# Patient Record
Sex: FEMALE | Race: WHITE | NOT HISPANIC OR LATINO | Employment: UNEMPLOYED | URBAN - NONMETROPOLITAN AREA
[De-identification: names, ages, dates, MRNs, and addresses within clinical notes are randomized per-mention and may not be internally consistent; named-entity substitution may affect disease eponyms.]

---

## 2023-02-08 ENCOUNTER — HOSPITAL ENCOUNTER (EMERGENCY)
Facility: HOSPITAL | Age: 23
Discharge: PSYCHIATRIC OR SUBSTANCE USE FACILITY | DRG: 885 | End: 2023-02-08
Attending: STUDENT IN AN ORGANIZED HEALTH CARE EDUCATION/TRAINING PROGRAM | Admitting: STUDENT IN AN ORGANIZED HEALTH CARE EDUCATION/TRAINING PROGRAM
Payer: COMMERCIAL

## 2023-02-08 ENCOUNTER — HOSPITAL ENCOUNTER (EMERGENCY)
Facility: HOSPITAL | Age: 23
Discharge: HOME | DRG: 885 | End: 2023-02-08
Attending: STUDENT IN AN ORGANIZED HEALTH CARE EDUCATION/TRAINING PROGRAM | Admitting: STUDENT IN AN ORGANIZED HEALTH CARE EDUCATION/TRAINING PROGRAM
Payer: COMMERCIAL

## 2023-02-08 ENCOUNTER — HOSPITAL ENCOUNTER (INPATIENT)
Facility: HOSPITAL | Age: 23
LOS: 7 days | Discharge: NURSING, REHAB OR LONG-TERM CARE | DRG: 885 | End: 2023-02-15
Attending: PSYCHIATRY & NEUROLOGY | Admitting: PSYCHIATRY & NEUROLOGY
Payer: COMMERCIAL

## 2023-02-08 VITALS
WEIGHT: 150 LBS | BODY MASS INDEX: 24.11 KG/M2 | TEMPERATURE: 97.3 F | HEIGHT: 66 IN | SYSTOLIC BLOOD PRESSURE: 117 MMHG | HEART RATE: 93 BPM | OXYGEN SATURATION: 97 % | RESPIRATION RATE: 18 BRPM | DIASTOLIC BLOOD PRESSURE: 74 MMHG

## 2023-02-08 VITALS
SYSTOLIC BLOOD PRESSURE: 108 MMHG | HEART RATE: 105 BPM | OXYGEN SATURATION: 98 % | BODY MASS INDEX: 24.11 KG/M2 | DIASTOLIC BLOOD PRESSURE: 63 MMHG | WEIGHT: 150 LBS | RESPIRATION RATE: 18 BRPM | TEMPERATURE: 97.3 F | HEIGHT: 66 IN

## 2023-02-08 DIAGNOSIS — R76.8 HEPATITIS C ANTIBODY TEST POSITIVE: Primary | ICD-10-CM

## 2023-02-08 DIAGNOSIS — F32.A DEPRESSION WITH SUICIDAL IDEATION: Primary | ICD-10-CM

## 2023-02-08 DIAGNOSIS — R45.851 DEPRESSION WITH SUICIDAL IDEATION: Primary | ICD-10-CM

## 2023-02-08 DIAGNOSIS — F32.A DEPRESSION, UNSPECIFIED DEPRESSION TYPE: Primary | ICD-10-CM

## 2023-02-08 LAB
ALBUMIN SERPL-MCNC: 3.5 G/DL (ref 3.5–5.2)
ALBUMIN/GLOB SERPL: 1.1 G/DL
ALP SERPL-CCNC: 113 U/L (ref 39–117)
ALT SERPL W P-5'-P-CCNC: 8 U/L (ref 1–33)
AMPHET+METHAMPHET UR QL: NEGATIVE
AMPHETAMINES UR QL: NEGATIVE
ANION GAP SERPL CALCULATED.3IONS-SCNC: 8.8 MMOL/L (ref 5–15)
AST SERPL-CCNC: 11 U/L (ref 1–32)
BACTERIA UR QL AUTO: ABNORMAL /HPF
BARBITURATES UR QL SCN: NEGATIVE
BASOPHILS # BLD AUTO: 0.04 10*3/MM3 (ref 0–0.2)
BASOPHILS NFR BLD AUTO: 0.5 % (ref 0–1.5)
BENZODIAZ UR QL SCN: NEGATIVE
BILIRUB SERPL-MCNC: <0.2 MG/DL (ref 0–1.2)
BILIRUB UR QL STRIP: NEGATIVE
BUN SERPL-MCNC: 10 MG/DL (ref 6–20)
BUN/CREAT SERPL: 15.9 (ref 7–25)
BUPRENORPHINE SERPL-MCNC: NEGATIVE NG/ML
CALCIUM SPEC-SCNC: 8.8 MG/DL (ref 8.6–10.5)
CANNABINOIDS SERPL QL: NEGATIVE
CHLORIDE SERPL-SCNC: 106 MMOL/L (ref 98–107)
CLARITY UR: ABNORMAL
CO2 SERPL-SCNC: 24.2 MMOL/L (ref 22–29)
COCAINE UR QL: NEGATIVE
COLOR UR: YELLOW
CREAT SERPL-MCNC: 0.63 MG/DL (ref 0.57–1)
DEPRECATED RDW RBC AUTO: 42.9 FL (ref 37–54)
EGFRCR SERPLBLD CKD-EPI 2021: 128 ML/MIN/1.73
EOSINOPHIL # BLD AUTO: 0.11 10*3/MM3 (ref 0–0.4)
EOSINOPHIL NFR BLD AUTO: 1.4 % (ref 0.3–6.2)
ERYTHROCYTE [DISTWIDTH] IN BLOOD BY AUTOMATED COUNT: 13 % (ref 12.3–15.4)
ETHANOL BLD-MCNC: <10 MG/DL (ref 0–10)
ETHANOL UR QL: <0.01 %
FLUAV RNA RESP QL NAA+PROBE: NOT DETECTED
FLUBV RNA RESP QL NAA+PROBE: NOT DETECTED
GLOBULIN UR ELPH-MCNC: 3.3 GM/DL
GLUCOSE SERPL-MCNC: 91 MG/DL (ref 65–99)
GLUCOSE UR STRIP-MCNC: NEGATIVE MG/DL
HCG SERPL QL: NEGATIVE
HCT VFR BLD AUTO: 40 % (ref 34–46.6)
HGB BLD-MCNC: 12.7 G/DL (ref 12–15.9)
HGB UR QL STRIP.AUTO: NEGATIVE
HYALINE CASTS UR QL AUTO: ABNORMAL /LPF
IMM GRANULOCYTES # BLD AUTO: 0.03 10*3/MM3 (ref 0–0.05)
IMM GRANULOCYTES NFR BLD AUTO: 0.4 % (ref 0–0.5)
KETONES UR QL STRIP: NEGATIVE
LEUKOCYTE ESTERASE UR QL STRIP.AUTO: ABNORMAL
LYMPHOCYTES # BLD AUTO: 2.47 10*3/MM3 (ref 0.7–3.1)
LYMPHOCYTES NFR BLD AUTO: 31.2 % (ref 19.6–45.3)
MAGNESIUM SERPL-MCNC: 1.7 MG/DL (ref 1.6–2.6)
MCH RBC QN AUTO: 28.6 PG (ref 26.6–33)
MCHC RBC AUTO-ENTMCNC: 31.8 G/DL (ref 31.5–35.7)
MCV RBC AUTO: 90.1 FL (ref 79–97)
METHADONE UR QL SCN: NEGATIVE
MONOCYTES # BLD AUTO: 0.58 10*3/MM3 (ref 0.1–0.9)
MONOCYTES NFR BLD AUTO: 7.3 % (ref 5–12)
NEUTROPHILS NFR BLD AUTO: 4.69 10*3/MM3 (ref 1.7–7)
NEUTROPHILS NFR BLD AUTO: 59.2 % (ref 42.7–76)
NITRITE UR QL STRIP: NEGATIVE
NRBC BLD AUTO-RTO: 0 /100 WBC (ref 0–0.2)
OPIATES UR QL: NEGATIVE
OXYCODONE UR QL SCN: NEGATIVE
PCP UR QL SCN: NEGATIVE
PH UR STRIP.AUTO: 6 [PH] (ref 5–8)
PLATELET # BLD AUTO: 256 10*3/MM3 (ref 140–450)
PMV BLD AUTO: 9.7 FL (ref 6–12)
POTASSIUM SERPL-SCNC: 4.1 MMOL/L (ref 3.5–5.2)
PROPOXYPH UR QL: NEGATIVE
PROT SERPL-MCNC: 6.8 G/DL (ref 6–8.5)
PROT UR QL STRIP: NEGATIVE
RBC # BLD AUTO: 4.44 10*6/MM3 (ref 3.77–5.28)
RBC # UR STRIP: ABNORMAL /HPF
REF LAB TEST METHOD: ABNORMAL
SARS-COV-2 RNA RESP QL NAA+PROBE: NOT DETECTED
SODIUM SERPL-SCNC: 139 MMOL/L (ref 136–145)
SP GR UR STRIP: <=1.005 (ref 1–1.03)
SQUAMOUS #/AREA URNS HPF: ABNORMAL /HPF
TRICYCLICS UR QL SCN: NEGATIVE
UROBILINOGEN UR QL STRIP: ABNORMAL
WBC # UR STRIP: ABNORMAL /HPF
WBC NRBC COR # BLD: 7.92 10*3/MM3 (ref 3.4–10.8)

## 2023-02-08 PROCEDURE — 80306 DRUG TEST PRSMV INSTRMNT: CPT | Performed by: STUDENT IN AN ORGANIZED HEALTH CARE EDUCATION/TRAINING PROGRAM

## 2023-02-08 PROCEDURE — 87636 SARSCOV2 & INF A&B AMP PRB: CPT | Performed by: STUDENT IN AN ORGANIZED HEALTH CARE EDUCATION/TRAINING PROGRAM

## 2023-02-08 PROCEDURE — 83735 ASSAY OF MAGNESIUM: CPT | Performed by: STUDENT IN AN ORGANIZED HEALTH CARE EDUCATION/TRAINING PROGRAM

## 2023-02-08 PROCEDURE — 84703 CHORIONIC GONADOTROPIN ASSAY: CPT | Performed by: STUDENT IN AN ORGANIZED HEALTH CARE EDUCATION/TRAINING PROGRAM

## 2023-02-08 PROCEDURE — 87591 N.GONORRHOEAE DNA AMP PROB: CPT | Performed by: PSYCHIATRY & NEUROLOGY

## 2023-02-08 PROCEDURE — 82077 ASSAY SPEC XCP UR&BREATH IA: CPT | Performed by: STUDENT IN AN ORGANIZED HEALTH CARE EDUCATION/TRAINING PROGRAM

## 2023-02-08 PROCEDURE — 36415 COLL VENOUS BLD VENIPUNCTURE: CPT

## 2023-02-08 PROCEDURE — 85025 COMPLETE CBC W/AUTO DIFF WBC: CPT | Performed by: STUDENT IN AN ORGANIZED HEALTH CARE EDUCATION/TRAINING PROGRAM

## 2023-02-08 PROCEDURE — 87661 TRICHOMONAS VAGINALIS AMPLIF: CPT | Performed by: PSYCHIATRY & NEUROLOGY

## 2023-02-08 PROCEDURE — 99285 EMERGENCY DEPT VISIT HI MDM: CPT

## 2023-02-08 PROCEDURE — 87491 CHLMYD TRACH DNA AMP PROBE: CPT | Performed by: PSYCHIATRY & NEUROLOGY

## 2023-02-08 PROCEDURE — 81001 URINALYSIS AUTO W/SCOPE: CPT | Performed by: STUDENT IN AN ORGANIZED HEALTH CARE EDUCATION/TRAINING PROGRAM

## 2023-02-08 PROCEDURE — C9803 HOPD COVID-19 SPEC COLLECT: HCPCS

## 2023-02-08 PROCEDURE — 93005 ELECTROCARDIOGRAM TRACING: CPT | Performed by: PSYCHIATRY & NEUROLOGY

## 2023-02-08 PROCEDURE — 99284 EMERGENCY DEPT VISIT MOD MDM: CPT

## 2023-02-08 PROCEDURE — 80053 COMPREHEN METABOLIC PANEL: CPT | Performed by: STUDENT IN AN ORGANIZED HEALTH CARE EDUCATION/TRAINING PROGRAM

## 2023-02-08 RX ORDER — TRAZODONE HYDROCHLORIDE 50 MG/1
100 TABLET ORAL NIGHTLY
Status: DISCONTINUED | OUTPATIENT
Start: 2023-02-08 | End: 2023-02-15 | Stop reason: HOSPADM

## 2023-02-08 RX ORDER — NITROFURANTOIN 25; 75 MG/1; MG/1
100 CAPSULE ORAL 2 TIMES DAILY
Status: CANCELLED | OUTPATIENT
Start: 2023-02-08 | End: 2023-02-12

## 2023-02-08 RX ORDER — RISPERIDONE 1 MG/1
2 TABLET ORAL 2 TIMES DAILY
Status: DISCONTINUED | OUTPATIENT
Start: 2023-02-08 | End: 2023-02-15 | Stop reason: HOSPADM

## 2023-02-08 RX ORDER — VENLAFAXINE HYDROCHLORIDE 150 MG/1
150 CAPSULE, EXTENDED RELEASE ORAL DAILY
COMMUNITY
End: 2023-02-15 | Stop reason: HOSPADM

## 2023-02-08 RX ORDER — BENZTROPINE MESYLATE 1 MG/1
1 TABLET ORAL 2 TIMES DAILY
Status: CANCELLED | OUTPATIENT
Start: 2023-02-08

## 2023-02-08 RX ORDER — VENLAFAXINE HYDROCHLORIDE 150 MG/1
150 CAPSULE, EXTENDED RELEASE ORAL DAILY
Status: CANCELLED | OUTPATIENT
Start: 2023-02-08

## 2023-02-08 RX ORDER — BENZTROPINE MESYLATE 1 MG/ML
1 INJECTION INTRAMUSCULAR; INTRAVENOUS ONCE AS NEEDED
Status: DISCONTINUED | OUTPATIENT
Start: 2023-02-08 | End: 2023-02-15 | Stop reason: HOSPADM

## 2023-02-08 RX ORDER — TRAZODONE HYDROCHLORIDE 50 MG/1
50 TABLET ORAL NIGHTLY PRN
Status: DISCONTINUED | OUTPATIENT
Start: 2023-02-08 | End: 2023-02-15 | Stop reason: HOSPADM

## 2023-02-08 RX ORDER — BENZTROPINE MESYLATE 1 MG/1
2 TABLET ORAL ONCE AS NEEDED
Status: DISCONTINUED | OUTPATIENT
Start: 2023-02-08 | End: 2023-02-15 | Stop reason: HOSPADM

## 2023-02-08 RX ORDER — TRAZODONE HYDROCHLORIDE 100 MG/1
100 TABLET ORAL NIGHTLY
Status: ON HOLD | COMMUNITY
End: 2023-02-15 | Stop reason: SDUPTHER

## 2023-02-08 RX ORDER — NITROFURANTOIN 25; 75 MG/1; MG/1
100 CAPSULE ORAL 2 TIMES DAILY
Status: DISCONTINUED | OUTPATIENT
Start: 2023-02-08 | End: 2023-02-10

## 2023-02-08 RX ORDER — ACETAMINOPHEN 325 MG/1
650 TABLET ORAL EVERY 6 HOURS PRN
Status: DISCONTINUED | OUTPATIENT
Start: 2023-02-08 | End: 2023-02-15 | Stop reason: HOSPADM

## 2023-02-08 RX ORDER — TRAZODONE HYDROCHLORIDE 50 MG/1
100 TABLET ORAL NIGHTLY
Status: CANCELLED | OUTPATIENT
Start: 2023-02-08

## 2023-02-08 RX ORDER — BENZTROPINE MESYLATE 1 MG/1
1 TABLET ORAL 2 TIMES DAILY
Status: ON HOLD | COMMUNITY
End: 2023-02-15 | Stop reason: SDUPTHER

## 2023-02-08 RX ORDER — ONDANSETRON 4 MG/1
4 TABLET, FILM COATED ORAL EVERY 6 HOURS PRN
Status: DISCONTINUED | OUTPATIENT
Start: 2023-02-08 | End: 2023-02-15 | Stop reason: HOSPADM

## 2023-02-08 RX ORDER — BENZTROPINE MESYLATE 1 MG/1
1 TABLET ORAL 2 TIMES DAILY
Status: DISCONTINUED | OUTPATIENT
Start: 2023-02-08 | End: 2023-02-15 | Stop reason: HOSPADM

## 2023-02-08 RX ORDER — FAMOTIDINE 20 MG/1
20 TABLET, FILM COATED ORAL 2 TIMES DAILY PRN
Status: DISCONTINUED | OUTPATIENT
Start: 2023-02-08 | End: 2023-02-15 | Stop reason: HOSPADM

## 2023-02-08 RX ORDER — LOPERAMIDE HYDROCHLORIDE 2 MG/1
2 CAPSULE ORAL
Status: DISCONTINUED | OUTPATIENT
Start: 2023-02-08 | End: 2023-02-15 | Stop reason: HOSPADM

## 2023-02-08 RX ORDER — VENLAFAXINE HYDROCHLORIDE 150 MG/1
150 CAPSULE, EXTENDED RELEASE ORAL DAILY
Status: DISCONTINUED | OUTPATIENT
Start: 2023-02-09 | End: 2023-02-11

## 2023-02-08 RX ORDER — NITROFURANTOIN 25; 75 MG/1; MG/1
100 CAPSULE ORAL 2 TIMES DAILY
COMMUNITY
End: 2023-02-15 | Stop reason: HOSPADM

## 2023-02-08 RX ORDER — NICOTINE 21 MG/24HR
1 PATCH, TRANSDERMAL 24 HOURS TRANSDERMAL
Status: DISCONTINUED | OUTPATIENT
Start: 2023-02-08 | End: 2023-02-11

## 2023-02-08 RX ORDER — RISPERIDONE 2 MG/1
2 TABLET ORAL 2 TIMES DAILY
COMMUNITY
End: 2023-02-15 | Stop reason: HOSPADM

## 2023-02-08 RX ORDER — ALUMINA, MAGNESIA, AND SIMETHICONE 2400; 2400; 240 MG/30ML; MG/30ML; MG/30ML
15 SUSPENSION ORAL EVERY 6 HOURS PRN
Status: DISCONTINUED | OUTPATIENT
Start: 2023-02-08 | End: 2023-02-15 | Stop reason: HOSPADM

## 2023-02-08 RX ORDER — IBUPROFEN 400 MG/1
400 TABLET ORAL EVERY 6 HOURS PRN
Status: DISCONTINUED | OUTPATIENT
Start: 2023-02-08 | End: 2023-02-15 | Stop reason: HOSPADM

## 2023-02-08 RX ORDER — HYDROXYZINE HYDROCHLORIDE 25 MG/1
50 TABLET, FILM COATED ORAL EVERY 6 HOURS PRN
Status: DISCONTINUED | OUTPATIENT
Start: 2023-02-08 | End: 2023-02-15 | Stop reason: HOSPADM

## 2023-02-08 RX ORDER — BENZONATATE 100 MG/1
100 CAPSULE ORAL 3 TIMES DAILY PRN
Status: DISCONTINUED | OUTPATIENT
Start: 2023-02-08 | End: 2023-02-15 | Stop reason: HOSPADM

## 2023-02-08 RX ORDER — RISPERIDONE 1 MG/1
2 TABLET ORAL 2 TIMES DAILY
Status: CANCELLED | OUTPATIENT
Start: 2023-02-08

## 2023-02-08 RX ORDER — ECHINACEA PURPUREA EXTRACT 125 MG
2 TABLET ORAL AS NEEDED
Status: DISCONTINUED | OUTPATIENT
Start: 2023-02-08 | End: 2023-02-15 | Stop reason: HOSPADM

## 2023-02-08 NOTE — NURSING NOTE
CHIDI Chandra stated OK to not repeat labs covid or UDS since pt once discharged didn't leave lobby.

## 2023-02-08 NOTE — NURSING NOTE
Pt given D/C instructions and verbalized understanding. Given oupt resource info. Pt left with all belongings. Pt left and said she would continue eval with [facility] on her own.

## 2023-02-08 NOTE — NURSING NOTE
Called and spoke with Dr. Sharpe, given intake assessment, labs, and clinicals. Instructed to discharge pt and refer to rehab. [id number] Discussed with ER provider.

## 2023-02-08 NOTE — PLAN OF CARE
Problem: Adult Behavioral Health Plan of Care  Goal: Plan of Care Review  Outcome: Ongoing, Progressing  Flowsheets (Taken 2/8/2023 2552)  Progress: no change  Plan of Care Reviewed With: patient  Patient Agreement with Plan of Care: agrees  Outcome Evaluation: new admit   Goal Outcome Evaluation:  Plan of Care Reviewed With: patient  Patient Agreement with Plan of Care: agrees     Progress: no change  Outcome Evaluation: new admit

## 2023-02-08 NOTE — NURSING NOTE
Spoke to doctor Mike intake information labs and V/S provided and discussed with provider, instructed to admit with routine SP3 orders [id number]. Patient and ER provider made aware of admitting orders and plan of care.

## 2023-02-08 NOTE — NURSING NOTE
Intake assessment completed. Pt was brought from [facility] for an eval. She has been at [facility] since yesterday and was in [place] before that. She states she started having SI this morning with no plan or intent. Denies HI AVH. Denies any pain. She states that she uses meth but hasn't since last week. Denies alcohol or any other drugs. She rates anxiety and depression both 10/10. She reports sleep and appetite have been okay. Pt is currently homeless and [facility] cannot take the pt back after discharge.

## 2023-02-08 NOTE — NURSING NOTE
"Pt states SI, just has crazy thoughts and that she hears voices.     She says the voices are \"telling me all kinds of different stuff\" states she is seeing things also    States she was going to swallow all of her trazodone.    Denies HI.    [facility] states will not take pt back once discharged, pt has no where to go once discharged here.      Pt denies substance or alcohol abuse.    COWS-5  CIWA-6    Pt has flat affect, response lag and slow speech pattern.    Anxiety-10/10  Depression-10/10  "

## 2023-02-09 LAB
C TRACH RRNA CVX QL NAA+PROBE: NOT DETECTED
HAV IGM SERPL QL IA: ABNORMAL
HBV CORE IGM SERPL QL IA: ABNORMAL
HBV SURFACE AG SERPL QL IA: ABNORMAL
HCV AB SER DONR QL: REACTIVE
HIV1+2 AB SER QL: NORMAL
N GONORRHOEA RRNA SPEC QL NAA+PROBE: DETECTED
QT INTERVAL: 402 MS
QTC INTERVAL: 427 MS
TRICHOMONAS VAGINALIS PCR: NOT DETECTED

## 2023-02-09 PROCEDURE — G0432 EIA HIV-1/HIV-2 SCREEN: HCPCS | Performed by: PSYCHIATRY & NEUROLOGY

## 2023-02-09 PROCEDURE — 99223 1ST HOSP IP/OBS HIGH 75: CPT | Performed by: PSYCHIATRY & NEUROLOGY

## 2023-02-09 PROCEDURE — 0 LIDOCAINE 1 % SOLUTION 5 ML VIAL: Performed by: PSYCHIATRY & NEUROLOGY

## 2023-02-09 PROCEDURE — 80074 ACUTE HEPATITIS PANEL: CPT | Performed by: PSYCHIATRY & NEUROLOGY

## 2023-02-09 PROCEDURE — 25010000002 CEFTRIAXONE PER 250 MG: Performed by: PSYCHIATRY & NEUROLOGY

## 2023-02-09 RX ORDER — DIPHENHYDRAMINE HYDROCHLORIDE 50 MG/ML
50 INJECTION INTRAMUSCULAR; INTRAVENOUS ONCE AS NEEDED
Status: DISCONTINUED | OUTPATIENT
Start: 2023-02-09 | End: 2023-02-15 | Stop reason: HOSPADM

## 2023-02-09 RX ORDER — DIPHENHYDRAMINE HYDROCHLORIDE 50 MG/ML
50 INJECTION INTRAMUSCULAR; INTRAVENOUS ONCE
Status: DISCONTINUED | OUTPATIENT
Start: 2023-02-09 | End: 2023-02-09

## 2023-02-09 RX ADMIN — RISPERIDONE 2 MG: 1 TABLET, FILM COATED ORAL at 09:23

## 2023-02-09 RX ADMIN — NICOTINE TRANSDERMAL SYSTEM 1 PATCH: 21 PATCH, EXTENDED RELEASE TRANSDERMAL at 09:23

## 2023-02-09 RX ADMIN — NITROFURANTOIN MONOHYDRATE/MACROCRYSTALLINE 100 MG: 25; 75 CAPSULE ORAL at 09:23

## 2023-02-09 RX ADMIN — BENZTROPINE MESYLATE 1 MG: 1 TABLET ORAL at 09:23

## 2023-02-09 RX ADMIN — BENZTROPINE MESYLATE 1 MG: 1 TABLET ORAL at 20:37

## 2023-02-09 RX ADMIN — VENLAFAXINE HYDROCHLORIDE 150 MG: 150 CAPSULE, EXTENDED RELEASE ORAL at 09:23

## 2023-02-09 RX ADMIN — LIDOCAINE HYDROCHLORIDE 500 MG: 10 INJECTION, SOLUTION EPIDURAL; INFILTRATION; INTRACAUDAL; PERINEURAL at 20:38

## 2023-02-09 RX ADMIN — HYDROXYZINE HYDROCHLORIDE 50 MG: 25 TABLET ORAL at 15:05

## 2023-02-09 RX ADMIN — ACETAMINOPHEN 650 MG: 325 TABLET ORAL at 16:15

## 2023-02-09 RX ADMIN — TRAZODONE HYDROCHLORIDE 100 MG: 50 TABLET ORAL at 20:38

## 2023-02-09 RX ADMIN — RISPERIDONE 2 MG: 1 TABLET, FILM COATED ORAL at 20:37

## 2023-02-09 RX ADMIN — NITROFURANTOIN MONOHYDRATE/MACROCRYSTALLINE 100 MG: 25; 75 CAPSULE ORAL at 20:38

## 2023-02-09 NOTE — PLAN OF CARE
Goal Outcome Evaluation:  Plan of Care Reviewed With: patient  Patient Agreement with Plan of Care: refuses to participate

## 2023-02-09 NOTE — PLAN OF CARE
Goal Outcome Evaluation:  Plan of Care Reviewed With: patient  Patient Agreement with Plan of Care: agrees  Consent Given to Review Plan with: No one  Progress: no change  Outcome Evaluation: Therapist met with Patient to review care plan, social history, and aftercare recommendations; Patient agreeable.      Problem: Adult Behavioral Health Plan of Care  Goal: Plan of Care Review  Outcome: Ongoing, Progressing  Flowsheets (Taken 2/9/2023 0957)  Consent Given to Review Plan with: No one  Progress: no change  Plan of Care Reviewed With: patient  Patient Agreement with Plan of Care: agrees  Outcome Evaluation:   Therapist met with Patient to review care plan, social history, and aftercare recommendations   Patient agreeable.  Goal: Patient-Specific Goal (Individualization)  Outcome: Ongoing, Progressing  Flowsheets  Taken 2/9/2023 1006  Patient-Specific Goals (Include Timeframe): Identify 2-3 coping skills, address relapse prevention measures, complete aftercare plans, and deny SI/HI prior to discharge.  Individualized Care Needs: Therapist to offer 1-4 therapy sessions, aftercare planninng, safety planning, group therapy, and brief CBt/MI interventions.  Anxieties, Fears or Concerns: none voiced  Taken 2/9/2023 0943  Patient Personal Strengths:   resilient   resourceful   self-reliant  Patient Vulnerabilities:   adverse childhood experience(s)   lacks insight into illness   poor impulse control   family/relationship conflict   substance abuse/addiction   limited support system   housing insecurity   occupational insecurity   history of unsuccessful treatment  Goal: Optimized Coping Skills in Response to Life Stressors  Outcome: Ongoing, Progressing  Flowsheets (Taken 2/9/2023 1006)  Optimized Coping Skills in Response to Life Stressors: making progress toward outcome  Intervention: Promote Effective Coping Strategies  Flowsheets (Taken 2/9/2023 1006)  Supportive Measures:   active listening utilized   counseling  provided   goal-setting facilitated   verbalization of feelings encouraged  Goal: Develops/Participates in Therapeutic [place] to Support Successful Transition  Outcome: Ongoing, Progressing  Flowsheets (Taken 2/9/2023 1006)  Develops/Participates in Therapeutic [place] to Support Successful Transition: making progress toward outcome  Intervention: Foster Therapeutic [place]  Flowsheets (Taken 2/9/2023 1006)  Trust Relationship/Rapport:   care explained   questions encouraged   choices provided   reassurance provided   emotional support provided   thoughts/feelings acknowledged   empathic listening provided   questions answered  Intervention: Mutually Develop Transition Plan  Flowsheets  Taken 2/9/2023 1006 by Adamaris Bacon MSW  Outpatient/Agency/Support Group Needs: residential services  Discharge Coordination/Progress:   Therapist met with Patient to complete discharge needs assessment   Patient agreeable.  Transition Support: follow-up care discussed  Transportation Anticipated: agency  Anticipated Discharge Disposition: residential substance use unit  Transportation Concerns: no car  Current Discharge Risk:   psychiatric illness   substance use/abuse  Concerns to be Addressed:   coping/stress   mental health  Readmission Within the Last 30 Days: no previous admission in last 30 days  Patient/Family Anticipated Services at Transition: rehabilitation services  Patient's Choice of Community Agency(s): [facility]  Offered/Gave Vendor List: no  Taken 2/8/2023 1858 by Radha Munoz, RN  Patient/Family Anticipates Transition to: inpatient rehabilitation facility     DATA: Therapist met individually with patient this date to introduce role and to discuss hospitalization expectations. Patient agreeable.     Patient signed consent for [employer] [facility] so collateral information could be obtained. She thought that we would be able to get her grandmother's phone number from them. This therapist did call and speak with  staff there. They report that Patient came to them from [place], and was only there for one night before reporting SI. They state because of this Patient can not return. They state that there were no other behavioral issues or concerns. This therapist asked if they had Patient's grandmother's number. They did not. They also informed this therapist that Patient's grandmother had started the guardianship process, but did not finish it. Therefore Patient is still her own guardian.     Patient signed [facility] for [facility].     Patient states that she is not allowed to return to [facility].     Clinical Maneuvering/Intervention:     Therapist assisted patient in processing above session content; acknowledged and normalized patient’s thoughts, feelings, and concerns.  Discussed the therapist/patient relationship and explain the parameters and limitations of relative confidentiality.  Also discussed the importance of active participation, and honesty to the treatment process.  Encouraged the patient to discuss/vent their feelings, frustrations, and fears concerning their ongoing medical issues and validated their feelings.     Discussed the importance of finding enjoyable activities and coping skills that the patient can engage in a regular basis. Discussed healthy coping skills such as distraction, self love, grounding, thought challenges/reframing, etc.  Provided patient with list of healthy coping skills this date. Discussed the importance of medication compliance.  Praised the patient for seeking help and spent the majority of the session building rapport.       Allowed patient to freely discuss issues without interruption or judgment. Provided safe, confidential environment to facilitate the development of positive therapeutic relationship and encourage open, honest communication.      Therapist addressed discharge safety planning this date. Assisted patient in identifying risk factors which would indicate the need for  "higher level of care after discharge;  including thoughts to harm self or others and/or self-harming behavior. Encouraged patient to call 911, or present to the nearest emergency room should any of these events occur. Discussed crisis intervention services and means to access.  Encouraged securing any objects of harm.       Therapist completed integrated summary, treatment plan, and initiated social history this date.  Therapist is strongly encouraging family involvement in treatment.       ASSESSMENT: Rachel Shea is a 22 year old  female who states that she is from [facility]. Patient states that she was living there with her grandmother before coming to [place] for rehab. She states that her grandmother has attempted to get guardianship and [employer] in the past, but has been unsuccessful. Patient was admitted for SI. She also reports AVH. Patient states that she has a history of psychiatric admissions, but this is her first [facility] admission. Patient reports using meth sometime last week. She states that she is very anxious today, and needs \"something for her nerves\". She is indecisive about some of the answers that she provides. She states that she needs help with rehab, then states that she just wants to go home. This therapist made her aware that it would be difficult to get her back home without speaking with her family first. Patient states understanding. She was calm and cooperative with assessment.      PLAN:       Patient to remain hospitalized this date.     Treatment team will focus efforts on stabilizing patient's acute symptoms while providing education on healthy coping and crisis management to reduce hospitalizations.   Patient requires daily psychiatrist evaluation and 24/7 nursing supervision to promote patient  safety.     Therapist will offer 1-4 individual sessions, 1 therapy group daily, family education, and appropriate referral.    Therapist recommends " inpatient rehab.

## 2023-02-09 NOTE — ED PROVIDER NOTES
Subjective   History of Present Illness  19-year-old female presents secondary to depression with suicidal ideation.  She was seen here earlier and did not have a plan however now she states that her depression is worse.  She never left [place].  She denies any medical complaints.  Her symptoms are moderate to severe.        Review of Systems   Constitutional: Negative.  Negative for fever.   HENT: Negative.    Respiratory: Negative.    Cardiovascular: Negative.  Negative for chest pain.   Gastrointestinal: Negative.  Negative for abdominal pain.   Endocrine: Negative.    Genitourinary: Negative.  Negative for dysuria.   Skin: Negative.    Neurological: Negative.    Psychiatric/Behavioral: Positive for suicidal ideas.   All other systems reviewed and are negative.      Past Medical History:   Diagnosis Date   • Hepatitis C    • PTSD (post-traumatic stress disorder)    • Schizoaffective disorder (HCC)        Allergies   Allergen Reactions   • Keflex [Cephalexin] Hives       Past Surgical History:   Procedure Laterality Date   • TONSILLECTOMY         History reviewed. No pertinent family history.    Social History     Socioeconomic History   • Marital status: Single   Tobacco Use   • Smoking status: Every Day     Packs/day: 1.00     Years: 7.00     Pack years: 7.00     Types: Cigarettes   • Smokeless tobacco: Current     Types: Snuff   Vaping Use   • Vaping Use: Some days   • Substances: Nicotine   • Devices: Disposable   Substance and Sexual Activity   • Alcohol use: Not Currently   • Drug use: Yes     Types: Methamphetamines   • Sexual activity: Not Currently           Objective   Physical Exam  Vitals and nursing note reviewed.   Constitutional:       General: She is not in acute distress.     Appearance: She is well-developed. She is not diaphoretic.   HENT:      Head: Normocephalic and atraumatic.      Right Ear: External ear normal.      Left Ear: External ear normal.      Nose: Nose normal.   Eyes:       Conjunctiva/sclera: Conjunctivae normal.      Pupils: Pupils are equal, round, and reactive to light.   Neck:      Vascular: No JVD.      Trachea: No tracheal deviation.   Cardiovascular:      Rate and Rhythm: Normal rate and regular rhythm.      Heart sounds: Normal heart sounds. No murmur heard.  Pulmonary:      Effort: Pulmonary effort is normal. No respiratory distress.      Breath sounds: Normal breath sounds. No wheezing.   Abdominal:      General: Bowel sounds are normal.      Palpations: Abdomen is soft.      Tenderness: There is no abdominal tenderness.   Musculoskeletal:         General: No deformity. Normal range of motion.      Cervical back: Normal range of motion and neck supple.   Skin:     General: Skin is warm and dry.      Coloration: Skin is not pale.      Findings: No erythema or rash.   Neurological:      Mental Status: She is alert and oriented to person, place, and time.      Cranial Nerves: No cranial nerve deficit.   Psychiatric:         Behavior: Behavior normal.         Thought Content: Thought content includes suicidal ideation. Thought content does not include suicidal plan.         Procedures           ED Course                                           Medical Decision Making  22-year-old female presents secondary to depression with suicidal ideation.  She was seen here earlier and did not have a plan however now she states that her depression is worse.  She never left [place].  She denies any medical complaints.  Her symptoms are moderate to severe.    Depression with suicidal ideation: acute illness or injury  Amount and/or Complexity of Data Reviewed  Discussion of management or test interpretation with external provider(s): On-call psychiatrist via the intake nurse        Final diagnoses:   Depression with suicidal ideation       ED Disposition  ED Disposition     ED Disposition   DC/Transfer to Behavioral Health Condition   Stable    Comment   --             No follow-up  provider specified.       Medication List      No changes were made to your prescriptions during this visit.          Tal Hopkins PA  02/08/23 1937
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
## 2023-02-09 NOTE — PLAN OF CARE
Goal Outcome Evaluation:  Plan of Care Reviewed With: patient  Patient Agreement with Plan of Care: agrees        Outcome Evaluation: Patient rates anxiety 10 depression 5 . Patient is calm and cooperative with staff and other patient's. Pt denies any SI/HI/AVH. Pt has no complaints on this shift.

## 2023-02-09 NOTE — NURSING NOTE
Spoke to dr villatoro regarding order for rocephin ordered with sterile water. Per dr villatoro change order to be given with lidocaine. [id number]

## 2023-02-09 NOTE — ED PROVIDER NOTES
Subjective   History of Present Illness  22-year-old female presents secondary to depression with suicidal ideation at times.  She denies any plan.  She does have history of schizoaffective disorder.  Denies any A/V hallucinations.  She denies any medical complaints.  Her symptoms are mild to moderate.  She presents by private vehicle.        Review of Systems   Constitutional: Negative.  Negative for fever.   HENT: Negative.    Respiratory: Negative.    Cardiovascular: Negative.  Negative for chest pain.   Gastrointestinal: Negative.  Negative for abdominal pain.   Endocrine: Negative.    Genitourinary: Negative.  Negative for dysuria.   Skin: Negative.    Neurological: Negative.    Psychiatric/Behavioral: Positive for suicidal ideas.   All other systems reviewed and are negative.      Past Medical History:   Diagnosis Date   • Hepatitis C    • PTSD (post-traumatic stress disorder)    • Schizoaffective disorder (HCC)        Allergies   Allergen Reactions   • Keflex [Cephalexin] Hives       Past Surgical History:   Procedure Laterality Date   • TONSILLECTOMY         History reviewed. No pertinent family history.    Social History     Socioeconomic History   • Marital status: Single   Tobacco Use   • Smoking status: Every Day     Packs/day: 1.00     Years: 7.00     Pack years: 7.00     Types: Cigarettes   • Smokeless tobacco: Current     Types: Snuff   Vaping Use   • Vaping Use: Some days   • Substances: Nicotine   • Devices: Disposable   Substance and Sexual Activity   • Alcohol use: Not Currently   • Drug use: Yes     Types: Methamphetamines   • Sexual activity: Not Currently           Objective   Physical Exam  Vitals and nursing note reviewed.   Constitutional:       General: She is not in acute distress.     Appearance: She is well-developed. She is not diaphoretic.   HENT:      Head: Normocephalic and atraumatic.      Right Ear: External ear normal.      Left Ear: External ear normal.      Nose: Nose normal.    Eyes:      Conjunctiva/sclera: Conjunctivae normal.      Pupils: Pupils are equal, round, and reactive to light.   Neck:      Vascular: No JVD.      Trachea: No tracheal deviation.   Cardiovascular:      Rate and Rhythm: Normal rate and regular rhythm.      Heart sounds: Normal heart sounds. No murmur heard.  Pulmonary:      Effort: Pulmonary effort is normal. No respiratory distress.      Breath sounds: Normal breath sounds. No wheezing.   Abdominal:      General: Bowel sounds are normal.      Palpations: Abdomen is soft.      Tenderness: There is no abdominal tenderness.   Musculoskeletal:         General: No deformity. Normal range of motion.      Cervical back: Normal range of motion and neck supple.   Skin:     General: Skin is warm and dry.      Coloration: Skin is not pale.      Findings: No erythema or rash.   Neurological:      Mental Status: She is alert and oriented to person, place, and time.      Cranial Nerves: No cranial nerve deficit.   Psychiatric:         Behavior: Behavior normal.         Thought Content: Thought content includes suicidal ideation. Thought content does not include suicidal plan.         Procedures           ED Course            Results for orders placed or performed during the hospital encounter of 02/08/23   COVID-19 and FLU A/B PCR - Swab, Nasopharynx    Specimen: Nasopharynx; Swab   Result Value Ref Range    COVID19 Not Detected Not Detected - Ref. Range    Influenza A PCR Not Detected Not Detected    Influenza B PCR Not Detected Not Detected   hCG, Serum, Qualitative    Specimen: Blood   Result Value Ref Range    HCG Qualitative Negative Negative   Comprehensive Metabolic Panel    Specimen: Blood   Result Value Ref Range    Glucose 91 65 - 99 mg/dL    BUN 10 6 - 20 mg/dL    Creatinine 0.63 0.57 - 1.00 mg/dL    Sodium 139 136 - 145 mmol/L    Potassium 4.1 3.5 - 5.2 mmol/L    Chloride 106 98 - 107 mmol/L    CO2 24.2 22.0 - 29.0 mmol/L    Calcium 8.8 8.6 - 10.5 mg/dL    Total  Protein 6.8 6.0 - 8.5 g/dL    Albumin 3.5 3.5 - 5.2 g/dL    ALT (SGPT) 8 1 - 33 U/L    AST (SGOT) 11 1 - 32 U/L    Alkaline Phosphatase 113 39 - 117 U/L    Total Bilirubin <0.2 0.0 - 1.2 mg/dL    Globulin 3.3 gm/dL    A/G Ratio 1.1 g/dL    BUN/Creatinine Ratio 15.9 7.0 - 25.0    Anion Gap 8.8 5.0 - 15.0 mmol/L    eGFR 128.0 >60.0 mL/min/1.73   Urinalysis With Microscopic If Indicated (No Culture) - Urine, Clean Catch    Specimen: Urine, Clean Catch   Result Value Ref Range    Color, UA Yellow Yellow, Straw    Appearance, UA Cloudy (A) Clear    pH, UA 6.0 5.0 - 8.0    Specific Gravity, UA <=1.005 1.005 - 1.030    Glucose, UA Negative Negative    Ketones, UA Negative Negative    Bilirubin, UA Negative Negative    Blood, UA Negative Negative    Protein, UA Negative Negative    Leuk Esterase, UA Moderate (2+) (A) Negative    Nitrite, UA Negative Negative    Urobilinogen, UA 0.2 E.U./dL 0.2 - 1.0 E.U./dL   Urine Drug Screen - Urine, Clean Catch    Specimen: Urine, Clean Catch   Result Value Ref Range    THC, Screen, Urine Negative Negative    Phencyclidine (PCP), Urine Negative Negative    Cocaine Screen, Urine Negative Negative    Methamphetamine, Ur Negative Negative    Opiate Screen Negative Negative    Amphetamine Screen, Urine Negative Negative    Benzodiazepine Screen, Urine Negative Negative    Tricyclic Antidepressants Screen Negative Negative    Methadone Screen, Urine Negative Negative    Barbiturates Screen, Urine Negative Negative    Oxycodone Screen, Urine Negative Negative    Propoxyphene Screen Negative Negative    Buprenorphine, Screen, Urine Negative Negative   Ethanol    Specimen: Blood   Result Value Ref Range    Ethanol <10 0 - 10 mg/dL    Ethanol % <0.010 %   Magnesium    Specimen: Blood   Result Value Ref Range    Magnesium 1.7 1.6 - 2.6 mg/dL   CBC Auto Differential    Specimen: Blood   Result Value Ref Range    WBC 7.92 3.40 - 10.80 10*3/mm3    RBC 4.44 3.77 - 5.28 10*6/mm3    Hemoglobin 12.7 12.0  - 15.9 g/dL    Hematocrit 40.0 34.0 - 46.6 %    MCV 90.1 79.0 - 97.0 fL    MCH 28.6 26.6 - 33.0 pg    MCHC 31.8 31.5 - 35.7 g/dL    RDW 13.0 12.3 - 15.4 %    RDW-SD 42.9 37.0 - 54.0 fl    MPV 9.7 6.0 - 12.0 fL    Platelets 256 140 - 450 10*3/mm3    Neutrophil % 59.2 42.7 - 76.0 %    Lymphocyte % 31.2 19.6 - 45.3 %    Monocyte % 7.3 5.0 - 12.0 %    Eosinophil % 1.4 0.3 - 6.2 %    Basophil % 0.5 0.0 - 1.5 %    Immature Grans % 0.4 0.0 - 0.5 %    Neutrophils, Absolute 4.69 1.70 - 7.00 10*3/mm3    Lymphocytes, Absolute 2.47 0.70 - 3.10 10*3/mm3    Monocytes, Absolute 0.58 0.10 - 0.90 10*3/mm3    Eosinophils, Absolute 0.11 0.00 - 0.40 10*3/mm3    Basophils, Absolute 0.04 0.00 - 0.20 10*3/mm3    Immature Grans, Absolute 0.03 0.00 - 0.05 10*3/mm3    nRBC 0.0 0.0 - 0.2 /100 WBC   Urinalysis, Microscopic Only - Urine, Clean Catch    Specimen: Urine, Clean Catch   Result Value Ref Range    RBC, UA None Seen None Seen, 0-2 /HPF    WBC, UA 6-12 (A) None Seen, 0-2 /HPF    Bacteria, UA None Seen None Seen /HPF    Squamous Epithelial Cells, UA 7-12 (A) None Seen, 0-2 /HPF    Hyaline Casts, UA None Seen None Seen /LPF    Methodology Manual Light Microscopy                                        Medical Decision Making  22-year-old female presents secondary to depression with suicidal ideation at times.  She denies any plan.  She does have history of schizoaffective disorder.  Denies any A/V hallucinations.  She denies any medical complaints.  Her symptoms are mild to moderate.  She presents by private vehicle.    Depression, unspecified depression type: complicated acute illness or injury  Amount and/or Complexity of Data Reviewed  Labs: ordered. Decision-making details documented in ED Course.  Discussion of management or test interpretation with external provider(s): On-call psychiatrist via the intake nurse        Final diagnoses:   Depression, unspecified depression type       ED Disposition  ED Disposition     ED Disposition    Discharge    Condition   Stable    Comment   --             [facility]  [address] [facility], [facility] [address] [phone]  Schedule an appointment as soon as possible for a visit            Medication List      No changes were made to your prescriptions during this visit.          Tal Hopkins PA  02/08/23 1932

## 2023-02-09 NOTE — NURSING NOTE
"Patient presented to ED stating she was having SI with plan to take all of her trazodone and that she was hearing voices \"telling me all kinds of different stuff\" and stating she also is seeing things. Upon assessment to unit pt states v\"I was feeling suicial today but not now.\" She denies SI/HI/AVH currently. Reports meth use IV 1 week ago and that she used 1 gram daily. Reports poor appetite and good sleep. She denies alcohol use . Pt was initially discharged and [facility] states will not take pt back once discharged, pt had no where to go once discharged .  "

## 2023-02-09 NOTE — NURSING NOTE
Per md since were pt has a mild allergy to keflex and rocephin falls in the same family, and her only reaction is hives. To put in an one time order for benadryl 50mg im x1 dose prn if pt develops hives. [id number]
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
## 2023-02-10 PROCEDURE — 99232 SBSQ HOSP IP/OBS MODERATE 35: CPT | Performed by: PSYCHIATRY & NEUROLOGY

## 2023-02-10 RX ADMIN — HYDROXYZINE HYDROCHLORIDE 50 MG: 25 TABLET ORAL at 15:09

## 2023-02-10 RX ADMIN — ACETAMINOPHEN 650 MG: 325 TABLET ORAL at 09:21

## 2023-02-10 RX ADMIN — NITROFURANTOIN MONOHYDRATE/MACROCRYSTALLINE 100 MG: 25; 75 CAPSULE ORAL at 08:10

## 2023-02-10 RX ADMIN — ACETAMINOPHEN 650 MG: 325 TABLET ORAL at 15:09

## 2023-02-10 RX ADMIN — BENZTROPINE MESYLATE 1 MG: 1 TABLET ORAL at 08:09

## 2023-02-10 RX ADMIN — RISPERIDONE 2 MG: 1 TABLET, FILM COATED ORAL at 08:09

## 2023-02-10 RX ADMIN — TRAZODONE HYDROCHLORIDE 100 MG: 50 TABLET ORAL at 20:06

## 2023-02-10 RX ADMIN — VENLAFAXINE HYDROCHLORIDE 150 MG: 150 CAPSULE, EXTENDED RELEASE ORAL at 08:09

## 2023-02-10 RX ADMIN — HYDROXYZINE HYDROCHLORIDE 50 MG: 25 TABLET ORAL at 09:21

## 2023-02-10 RX ADMIN — NICOTINE TRANSDERMAL SYSTEM 1 PATCH: 21 PATCH, EXTENDED RELEASE TRANSDERMAL at 08:09

## 2023-02-10 RX ADMIN — ACETAMINOPHEN 650 MG: 325 TABLET ORAL at 21:23

## 2023-02-10 RX ADMIN — RISPERIDONE 2 MG: 1 TABLET, FILM COATED ORAL at 20:06

## 2023-02-10 RX ADMIN — IBUPROFEN 400 MG: 400 TABLET, FILM COATED ORAL at 17:15

## 2023-02-10 RX ADMIN — BENZTROPINE MESYLATE 1 MG: 1 TABLET ORAL at 20:06

## 2023-02-10 NOTE — PROGRESS NOTES
"INPATIENT PSYCHIATRIC PROGRESS NOTE    Name:  Rachel Shea  :  2000  MRN:  [id number]  Visit Number:  [id number]  Length of stay:  2    SUBJECTIVE    CC/Focus of Exam: Psychosis    INTERVAL HISTORY:  Patient with some improvement in communication, thought process, attention and concentration.  She appears to be acted on the unit, concern for increased motor activity as she is frequently seen pacing.  She immediately discusses going to rehab on Monday, despite coming from rehab yesterday.  Largely denying psychotic symptom burden, but still exhibiting thought blocking, disorganization, potentially responding to internal stimuli.  We received updated medication list and made changes.  We will monitor for improvement over the weekend and consider referrals.    Depression rating 3/10  Anxiety rating 7/10  Sleep: Fair  Withdrawal sx: Denied  Cravin10    Review of Systems   Constitutional: Negative.    Respiratory: Negative.    Cardiovascular: Negative.    Gastrointestinal: Negative.    Musculoskeletal: Negative.    Psychiatric/Behavioral: Positive for dysphoric mood and hallucinations. The patient is nervous/anxious and is hyperactive.        OBJECTIVE    Temp:  [97.9 °F (36.6 °C)-98.1 °F (36.7 °C)] 97.9 °F (36.6 °C)  Heart Rate:  [] 73  Resp:  [18] 18  BP: (100-114)/(64-80) 112/80    MENTAL STATUS EXAM:  Appearance: Casually dressed, fair hygeine.   Cooperation: Mostly cooperative  Psychomotor: Mild psychomotor agitation/retardation, No EPS, No motor tics  Speech: normal rate, amount when she starts talking.  Mood: \"Okay\"   Affect: congruent, restricted  Thought Content: goal directed, denies delusional material  Thought process: Disorganized  Suicidality: Improving SI  Homicidality: No HI  Perception: Denies AH/VH but appears to potentially be responding to internal stimuli  Insight: Poor  Judgment: fair    Lab Results (last 24 hours)     Procedure Component Value Units Date/Time    HIV-1 & HIV-2 " Antibodies [[id number]]  (Normal) Collected: 02/09/23 1552    Specimen: Blood Updated: 02/09/23 1655    Narrative:      The following orders were created for panel order HIV-1 & HIV-2 Antibodies.  Procedure                               Abnormality         Status                     ---------                               -----------         ------                     HIV-1 / O / 2 Ag / Antib...[[id number]]  Normal              Final result                 Please view results for these tests on the individual orders.    HIV-1 / O / 2 Ag / Antibody 4th Generation [[id number]]  (Normal) Collected: 02/09/23 1552    Specimen: Blood Updated: 02/09/23 1655     HIV-1/ HIV-2 Non-Reactive     Comment: A non-reactive test result does not preclude the possibility of exposure to HIV or infection with HIV. An antibody response to recent exposure may take several months to reach detectable levels.       Narrative:      The HIV antibody/antigen combo assay is a qualitative assay for HIV that includes the p24 antigen as well as antibodies to HIV types 1 and 2. This test is intended to be used as a screening assay in the diagnosis of HIV infection in patients over the age of 2.    Hepatitis Panel, Acute [[id number]]  (Abnormal) Collected: 02/09/23 1552    Specimen: Blood Updated: 02/09/23 1648     Hepatitis B Surface Ag Non-Reactive     Hep A IgM Non-Reactive     Hep B C IgM Non-Reactive     Hepatitis C Ab Reactive    Narrative:      Results may be falsely decreased if patient taking Biotin.     Chlamydia trachomatis, Neisseria gonorrhoeae, Trichomonas vaginalis, PCR - Urine, Urine, Clean Catch [[id number]]  (Abnormal) Collected: 02/08/23 1235    Specimen: Urine, Clean Catch Updated: 02/09/23 1533     Chlamydia DNA by PCR Not Detected     Neisseria gonorrhoeae by PCR Detected     Trichomonas vaginalis PCR Not Detected             Imaging Results (Last 24 Hours)     ** No results found for the last 24 hours. **             ECG/EMG Results  (most recent)     Procedure Component Value Units Date/Time    ECG 12 Lead Other [[id number]] Collected: 02/08/23 2248     Updated: 02/09/23 0819     QT Interval 402 ms      QTC Interval 427 ms     Narrative:      Test Reason : Baseline Cardiac Status~  Blood Pressure :   */*   mmHG  Vent. Rate :  68 BPM     Atrial Rate :  68 BPM     P-R Int : 162 ms          QRS Dur :  84 ms      QT Int : 402 ms       P-R-T Axes :  61  55  52 degrees     QTc Int : 427 ms    Normal sinus rhythm  Normal ECG  No previous ECGs available  Confirmed by Cas Blackwell ([id number]) on 2/9/2023 8:19:12 AM    Referred By:            Confirmed By: Cas Blackwell           ALLERGIES: Keflex [cephalexin]      Current Facility-Administered Medications:   •  acetaminophen (TYLENOL) tablet 650 mg, 650 mg, Oral, Q6H PRN, Raymond Mike MD, 650 mg at 02/10/23 0921  •  aluminum-magnesium hydroxide-simethicone (MAALOX MAX) 400-400-40 MG/5ML suspension 15 mL, 15 mL, Oral, Q6H PRN, Raymond Mike MD  •  benzonatate (TESSALON) capsule 100 mg, 100 mg, Oral, TID PRN, Raymond Mike MD  •  benztropine (COGENTIN) tablet 2 mg, 2 mg, Oral, Once PRN **OR** benztropine (COGENTIN) injection 1 mg, 1 mg, Intramuscular, Once PRN, Raymond Mike MD  •  benztropine (COGENTIN) tablet 1 mg, 1 mg, Oral, BID, Raymond Mike MD, 1 mg at 02/10/23 0809  •  diphenhydrAMINE (BENADRYL) injection 50 mg, 50 mg, Intramuscular, Once PRN, Be Sharpe MD  •  famotidine (PEPCID) tablet 20 mg, 20 mg, Oral, BID PRN, Raymond Mike MD  •  hydrOXYzine (ATARAX) tablet 50 mg, 50 mg, Oral, Q6H PRN, Raymond Mike MD, 50 mg at 02/10/23 0921  •  ibuprofen (ADVIL,MOTRIN) tablet 400 mg, 400 mg, Oral, Q6H PRN, Raymond Mike MD  •  loperamide (IMODIUM) capsule 2 mg, 2 mg, Oral, Q2H PRN, Raymond Mike MD  •  magnesium hydroxide (MILK OF MAGNESIA) suspension 10 mL, 10 mL, Oral, Daily PRN, Raymond Mike MD  •  nicotine (NICODERM CQ) 21 MG/24HR patch 1 patch, 1 patch,  Transdermal, Q24H, Raymond Mike MD, 1 patch at 02/10/23 0809  •  nitrofurantoin (macrocrystal-monohydrate) (MACROBID) capsule 100 mg, 100 mg, Oral, BID, aRymond Mike MD, 100 mg at 02/10/23 0810  •  ondansetron (ZOFRAN) tablet 4 mg, 4 mg, Oral, Q6H PRN, Raymond Mike MD  •  risperiDONE (risperDAL) tablet 2 mg, 2 mg, Oral, BID, Raymond Mike MD, 2 mg at 02/10/23 0809  •  sodium chloride nasal spray 2 spray, 2 spray, Each Nare, PRN, Raymond Mike MD  •  traZODone (DESYREL) tablet 100 mg, 100 mg, Oral, Nightly, Raymond Mike MD, 100 mg at 02/09/23 2038  •  traZODone (DESYREL) tablet 50 mg, 50 mg, Oral, Nightly PRN, Raymond Mike MD  •  venlafaxine XR (EFFEXOR-XR) 24 hr capsule 150 mg, 150 mg, Oral, Daily, Raymond Mike MD, 150 mg at 02/10/23 0809    Reviewed chart, notes, vitals, labs and EKG personally reviewed.    ASSESSMENT & PLAN:    Suicidal Ideation  -SI with plan  -Admit for crisis stabilization  -SP3     Schizophrenia, disorganized  -Patient disorganized, exhibiting thought blocking, hallucinations  -Continue previously prescribed risperidone 2 mg twice daily.  We will encourage STOLL prior to discharge  -Continue benztropine 1 mg twice daily  -Continue trazodone 100 mg nightly  -Continue Effexor  mg daily  -We will try to obtain more information once patient improves  -We will refer for outpatient psychiatric care following hospitalization     Methamphetamine use disorder, severe, dependence  -Admission UDS negative.  Patient reports last use roughly 1 week ago  -Supportive care  -Ascertain substance abuse treatment plans following discharge     Urinary tract infection  -UA with 0 RBC, 6-12 WBC, negative nitrite  -PCR positive for gonorrhea.  Treated with one-time Rocephin 500 mg IM  -DC Macrobid    Nicotine use disorder, severe, dependence  -21 mg daily patch ordered  -Encouraged cessation     The patient has been admitted for safety and stabilization.  Patient will be  monitored for suicidality daily and maintained on Special Precautions Level 3 (q15 min checks) .  The patient will have individual and group therapy with a master's level therapist. A master treatment plan will be developed and agreed upon by the patient and his/her treatment team.  The patient's estimated length of stay in the hospital is 4-6 days.       Special precautions: Special Precautions Level 3 (q15 min checks)     Behavioral Health Treatment Plan and Problem List: I have reviewed and approved the Behavioral Health Treatment Plan and Problem list.  The patient has had a chance to review and agrees with the treatment plan.     Clinician:  Be Sharpe MD  02/10/23  10:31 EST

## 2023-02-10 NOTE — PLAN OF CARE
Goal Outcome Evaluation:  Plan of Care Reviewed With: patient  Patient Agreement with Plan of Care: agrees  Consent Given to Review Plan with: No one  Progress: improving  Outcome Evaluation: Therapist met with Patient to review treatment progress and aftercare plans; Patient agreeable.      Problem: Adult Behavioral Health Plan of Care  Goal: Plan of Care Review  Outcome: Ongoing, Progressing  Flowsheets  Taken 2/10/2023 1058  Progress: improving  Plan of Care Reviewed With: patient  Patient Agreement with Plan of Care: agrees  Outcome Evaluation:   Therapist met with Patient to review treatment progress and aftercare plans   Patient agreeable.  Taken 2/9/2023 0957  Consent Given to Review Plan with: No one  Goal: Optimized Coping Skills in Response to Life Stressors  Outcome: Ongoing, Progressing  Flowsheets (Taken 2/10/2023 1058)  Optimized Coping Skills in Response to Life Stressors: making progress toward outcome  Intervention: Promote Effective Coping Strategies  Flowsheets (Taken 2/10/2023 1058)  Supportive Measures:   active listening utilized   counseling provided   verbalization of feelings encouraged  Goal: Develops/Participates in Therapeutic [place] to Support Successful Transition  Outcome: Ongoing, Progressing  Flowsheets (Taken 2/10/2023 1058)  Develops/Participates in Therapeutic [place] to Support Successful Transition: making progress toward outcome  Intervention: Foster Therapeutic Roggen  Flowsheets (Taken 2/10/2023 1058)  Trust Relationship/Rapport:   care explained   empathic listening provided   choices provided   questions answered   emotional support provided   questions encouraged   reassurance provided   thoughts/feelings acknowledged     DATA: Therapist met with Patient individually this date. Patient agreeable to discuss current treatment progress and discharge concerns.     This therapist spoke with Nancy at [facility] this morning. She wanted a letter stating to our knowledge  "that [employer] was her own guardian. This therapist provided this to her. She states that she would like to receive progress notes on Patient on Monday, and that they would accept her at their [place] location when Patient is ready for discharge.     CLINICAL MANUVERING/INTERVENTIONS:  Assisted Patient in processing session content; acknowledged and normalized Patient’s thoughts, feelings, and concerns by utilizing a person-centered approach in efforts to build appropriate rapport and a positive therapeutic relationship with open and honest communication. Allowed Patient to ventilate regarding current stressors and triggers for negative emotions and thoughts in a safe nonjudgmental environment with unconditional positive regard, active listening skills, and empathy.     ASSESSMENT: Patient was seen 1-1 for a follow up today. Patient continues to receive treatment for psychosis and SI. Patient states that she is feeling \"a little better\" today. Patient still reports anxiety and depression but denies SI, HI, and AVH. Patient is very focused on her discharge plan at this time, and struggles to focus on discussing anything else. When trying to discuss healthy coping with Patient she walked off to do something else. She then came back to this therapist a few minuets later and asked for help with getting a bra, as she was not allowed to have the one she brought because it had a wire in it. This therapist was explaining that we would try to find her one and she walked off again. She is pacing a lot and having trouble sitting still. She also asks the same questions regarding her discharge plan multiple times.     PLAN:   Patient will continue stabilization. Patient will continue to receive services offered by Treatment Team.     Patient will follow-up with [facility].    "

## 2023-02-10 NOTE — PLAN OF CARE
"Goal Outcome Evaluation:  Plan of Care Reviewed With: patient  Patient Agreement with Plan of Care: agrees        Outcome Evaluation: Patient rates anxiety 7 depression 2. Patient is calm and cooperative with staff and other patient's. Pt denies any SI/HI. Pt reports aud kati \" I cant explain\"  Pt has no complaints on this shift  "

## 2023-02-10 NOTE — PROGRESS NOTES
Pharmacy checked on price of Perseris. According to patient's plan, medication requires a prior authorization. PA has been submitted and approved through 2/9/24. According to patient's plan, copay will be $0 for 1 month supply. No other issues identified at this time.    Heena Germain, PharmD  02/10/23  11:02 EST

## 2023-02-10 NOTE — PLAN OF CARE
Goal Outcome Evaluation:  Plan of Care Reviewed With: patient  Patient Agreement with Plan of Care: agrees        Outcome Evaluation: Patient reports anxiety at 9 and depression at 10.  Denies SI/HI or AVH.  Patient cooperative this shift.

## 2023-02-11 PROCEDURE — 99232 SBSQ HOSP IP/OBS MODERATE 35: CPT | Performed by: PSYCHIATRY & NEUROLOGY

## 2023-02-11 RX ORDER — PROPRANOLOL HYDROCHLORIDE 10 MG/1
10 TABLET ORAL 3 TIMES DAILY PRN
Status: DISCONTINUED | OUTPATIENT
Start: 2023-02-11 | End: 2023-02-15 | Stop reason: HOSPADM

## 2023-02-11 RX ADMIN — TRAZODONE HYDROCHLORIDE 100 MG: 50 TABLET ORAL at 20:44

## 2023-02-11 RX ADMIN — RISPERIDONE 2 MG: 1 TABLET, FILM COATED ORAL at 08:54

## 2023-02-11 RX ADMIN — ACETAMINOPHEN 650 MG: 325 TABLET ORAL at 09:54

## 2023-02-11 RX ADMIN — BENZTROPINE MESYLATE 1 MG: 1 TABLET ORAL at 20:44

## 2023-02-11 RX ADMIN — VENLAFAXINE HYDROCHLORIDE 150 MG: 150 CAPSULE, EXTENDED RELEASE ORAL at 08:54

## 2023-02-11 RX ADMIN — NICOTINE POLACRILEX 4 MG: 2 GUM, CHEWING BUCCAL at 14:42

## 2023-02-11 RX ADMIN — NICOTINE TRANSDERMAL SYSTEM 1 PATCH: 21 PATCH, EXTENDED RELEASE TRANSDERMAL at 08:55

## 2023-02-11 RX ADMIN — RISPERIDONE 2 MG: 1 TABLET, FILM COATED ORAL at 20:44

## 2023-02-11 RX ADMIN — BENZTROPINE MESYLATE 1 MG: 1 TABLET ORAL at 08:54

## 2023-02-11 NOTE — PLAN OF CARE
Goal Outcome Evaluation:  Plan of Care Reviewed With: patient  Patient Agreement with Plan of Care: agrees        Pt states anxiety 10, depression 8. Pt is calm and cooperative with staff, med compliant.

## 2023-02-11 NOTE — PROGRESS NOTES
"INPATIENT PSYCHIATRIC PROGRESS NOTE    Name:  Rachel Shea  :  2000  MRN:  [id number]  Visit Number:  [id number]  Length of stay:  3    SUBJECTIVE    CC/Focus of Exam: Psychosis    INTERVAL HISTORY:  Patient appears less psychotic than admission today, but is still exhibiting significant symptoms, including increased psychomotor activity, pacing, disorganized thoughts, poor concentration, restricted affect, thought blocking.  She reports continued auditory hallucinations of voices, but cannot or will not explain further.  She is reporting elevated anxiety, intermittently throughout the day.  She mentioned Ativan, and we discussed other possible treatment options.  She is tolerating medications.  She reports sleep is improving.    Depression rating 2/10  Anxiety rating 5/10  Sleep: Fair  Withdrawal sx: Denied  Cravin/10    Review of Systems   Constitutional: Negative.    Respiratory: Negative.    Cardiovascular: Negative.    Gastrointestinal: Negative.    Musculoskeletal: Negative.    Psychiatric/Behavioral: Positive for decreased concentration and hallucinations. The patient is nervous/anxious and is hyperactive.        OBJECTIVE    Temp:  [97.8 °F (36.6 °C)-97.9 °F (36.6 °C)] 97.9 °F (36.6 °C)  Heart Rate:  [58-94] 94  Resp:  [18] 18  BP: (120-123)/(75-76) 123/75    MENTAL STATUS EXAM:  Appearance: Casually dressed, fair hygeine.   Cooperation: Mostly cooperative  Psychomotor: + Psychomotor agitation/retardation, No EPS, No motor tics  Speech: Some thought blocking but speech is improving  Mood: \"Anxious\"   Affect: congruent, restricted  Thought Content: goal directed, denies delusional material  Thought process: Disorganized  Suicidality: Denies SI  Homicidality: No HI  Perception: Denies AH/VH, she has not been observed responding to internal stimuli since yesterday  Insight: Poor  Judgment: fair    Lab Results (last 24 hours)     ** No results found for the last 24 hours. **             Imaging " Results (Last 24 Hours)     ** No results found for the last 24 hours. **             ECG/EMG Results (most recent)     Procedure Component Value Units Date/Time    ECG 12 Lead Other [[id number]] Collected: 02/08/23 2248     Updated: 02/09/23 0819     QT Interval 402 ms      QTC Interval 427 ms     Narrative:      Test Reason : Baseline Cardiac Status~  Blood Pressure :   */*   mmHG  Vent. Rate :  68 BPM     Atrial Rate :  68 BPM     P-R Int : 162 ms          QRS Dur :  84 ms      QT Int : 402 ms       P-R-T Axes :  61  55  52 degrees     QTc Int : 427 ms    Normal sinus rhythm  Normal ECG  No previous ECGs available  Confirmed by Cas Blackwell ([id number]) on 2/9/2023 8:19:12 AM    Referred By:            Confirmed By: Cas Blackwell           ALLERGIES: Keflex [cephalexin]      Current Facility-Administered Medications:   •  acetaminophen (TYLENOL) tablet 650 mg, 650 mg, Oral, Q6H PRN, Raymond Mike MD, 650 mg at 02/11/23 0954  •  aluminum-magnesium hydroxide-simethicone (MAALOX MAX) 400-400-40 MG/5ML suspension 15 mL, 15 mL, Oral, Q6H PRN, Raymond Mike MD  •  benzonatate (TESSALON) capsule 100 mg, 100 mg, Oral, TID PRN, Raymond Mike MD  •  benztropine (COGENTIN) tablet 2 mg, 2 mg, Oral, Once PRN **OR** benztropine (COGENTIN) injection 1 mg, 1 mg, Intramuscular, Once PRN, Raymond Mike MD  •  benztropine (COGENTIN) tablet 1 mg, 1 mg, Oral, BID, Raymond Mike MD, 1 mg at 02/11/23 0854  •  diphenhydrAMINE (BENADRYL) injection 50 mg, 50 mg, Intramuscular, Once PRN, Be Sharpe MD  •  famotidine (PEPCID) tablet 20 mg, 20 mg, Oral, BID PRN, Raymond Mike MD  •  hydrOXYzine (ATARAX) tablet 50 mg, 50 mg, Oral, Q6H PRN, Raymond Mike MD, 50 mg at 02/10/23 1509  •  ibuprofen (ADVIL,MOTRIN) tablet 400 mg, 400 mg, Oral, Q6H PRN, Raymond Mike MD, 400 mg at 02/10/23 1715  •  loperamide (IMODIUM) capsule 2 mg, 2 mg, Oral, Q2H PRN, Raymond Mike MD  •  magnesium hydroxide (MILK OF  MAGNESIA) suspension 10 mL, 10 mL, Oral, Daily PRN, Raymond Mike MD  •  nicotine polacrilex (NICORETTE) gum 4 mg, 4 mg, Mouth/Throat, Q4H PRN, Be Sharpe MD, 4 mg at 02/11/23 1442  •  ondansetron (ZOFRAN) tablet 4 mg, 4 mg, Oral, Q6H PRN, Raymond Mike MD  •  propranolol (INDERAL) tablet 10 mg, 10 mg, Oral, TID PRN, Be Sharpe MD  •  risperiDONE (risperDAL) tablet 2 mg, 2 mg, Oral, BID, Raymond Mike MD, 2 mg at 02/11/23 0854  •  sodium chloride nasal spray 2 spray, 2 spray, Each Nare, PRN, Raymond Mike MD  •  traZODone (DESYREL) tablet 100 mg, 100 mg, Oral, Nightly, Raymond Mike MD, 100 mg at 02/10/23 2006  •  traZODone (DESYREL) tablet 50 mg, 50 mg, Oral, Nightly PRN, Raymond Mike MD  •  [START ON 2/12/2023] venlafaxine XR (EFFEXOR-XR) 24 hr capsule 225 mg, 225 mg, Oral, Daily, Be Sharpe MD    Reviewed chart, notes, vitals, labs and EKG personally reviewed.    ASSESSMENT & PLAN:    Suicidal Ideation  -SI with plan  -Admit for crisis stabilization  -SP3     Schizophrenia, disorganized  -Patient disorganized, exhibiting thought blocking, hallucinations  -Continue previously prescribed risperidone 2 mg twice daily.  We will encourage STOLL prior to discharge  -Continue benztropine 1 mg twice daily  -Continue trazodone 100 mg nightly  -Increase Effexor XR to 225 mg daily  -We will try to obtain more information once patient improves  -We will refer for outpatient psychiatric care following hospitalization     Methamphetamine use disorder, severe, dependence  -Admission UDS negative.  Patient reports last use roughly 1 week ago  -Supportive care  -Ascertain substance abuse treatment plans following discharge     Urinary tract infection  -UA with 0 RBC, 6-12 WBC, negative nitrite  -PCR positive for gonorrhea.  Treated with one-time Rocephin 500 mg IM  -DC Macrobid    Nicotine use disorder, severe, dependence  -21 mg daily patch ordered  -Encouraged cessation     The patient  has been admitted for safety and stabilization.  Patient will be monitored for suicidality daily and maintained on Special Precautions Level 3 (q15 min checks) .  The patient will have individual and group therapy with a master's level therapist. A master treatment plan will be developed and agreed upon by the patient and his/her treatment team.  The patient's estimated length of stay in the hospital is 3-5 days.       Special precautions: Special Precautions Level 3 (q15 min checks)     Behavioral Health Treatment Plan and Problem List: I have reviewed and approved the Behavioral Health Treatment Plan and Problem list.  The patient has had a chance to review and agrees with the treatment plan.     Clinician:  Be Sharpe MD  02/11/23  15:45 EST

## 2023-02-11 NOTE — PLAN OF CARE
Goal Outcome Evaluation:  Plan of Care Reviewed With: patient  Patient Agreement with Plan of Care: agrees     Progress: improving  Outcome Evaluation: Patient calm and cooperative this shift. Denies anxiety, depression, SI/HI or AVH.

## 2023-02-12 LAB — GLUCOSE BLDC GLUCOMTR-MCNC: 94 MG/DL (ref 70–130)

## 2023-02-12 PROCEDURE — 82962 GLUCOSE BLOOD TEST: CPT

## 2023-02-12 PROCEDURE — 99232 SBSQ HOSP IP/OBS MODERATE 35: CPT | Performed by: PSYCHIATRY & NEUROLOGY

## 2023-02-12 RX ADMIN — RISPERIDONE 2 MG: 1 TABLET, FILM COATED ORAL at 20:22

## 2023-02-12 RX ADMIN — HYDROXYZINE HYDROCHLORIDE 50 MG: 25 TABLET ORAL at 12:00

## 2023-02-12 RX ADMIN — RISPERIDONE 2 MG: 1 TABLET, FILM COATED ORAL at 08:32

## 2023-02-12 RX ADMIN — VENLAFAXINE HYDROCHLORIDE 225 MG: 75 CAPSULE, EXTENDED RELEASE ORAL at 08:33

## 2023-02-12 RX ADMIN — ACETAMINOPHEN 650 MG: 325 TABLET ORAL at 12:00

## 2023-02-12 RX ADMIN — TRAZODONE HYDROCHLORIDE 100 MG: 50 TABLET ORAL at 20:21

## 2023-02-12 RX ADMIN — NICOTINE POLACRILEX 4 MG: 2 GUM, CHEWING BUCCAL at 08:34

## 2023-02-12 RX ADMIN — ACETAMINOPHEN 650 MG: 325 TABLET ORAL at 20:22

## 2023-02-12 RX ADMIN — NICOTINE POLACRILEX 4 MG: 2 GUM, CHEWING BUCCAL at 17:37

## 2023-02-12 RX ADMIN — BENZTROPINE MESYLATE 1 MG: 1 TABLET ORAL at 20:22

## 2023-02-12 RX ADMIN — BENZTROPINE MESYLATE 1 MG: 1 TABLET ORAL at 08:32

## 2023-02-12 NOTE — PROGRESS NOTES
"INPATIENT PSYCHIATRIC PROGRESS NOTE    Name:  Rachel Shea  :  2000  MRN:  [id number]  Visit Number:  [id number]  Length of stay:  4    SUBJECTIVE    CC/Focus of Exam: Psychosis    INTERVAL HISTORY:  Patient with no significant changes today.  She is a bit more restricted, but is denying any distressing symptoms.  Still concerning for disorganized thoughts and poor insight, but has appeared stable overall and is requesting to go to rehab.  She will be receiving Perseris before discharge.    Depression rating 2/10  Anxiety rating 3/10  Sleep: Fair  Withdrawal sx: Denied  Cravin/10    Review of Systems   Constitutional: Negative.    Respiratory: Negative.    Cardiovascular: Negative.    Gastrointestinal: Negative.    Musculoskeletal: Negative.    Psychiatric/Behavioral: Positive for decreased concentration and hallucinations. The patient is nervous/anxious.        OBJECTIVE    Temp:  [96.5 °F (35.8 °C)-97 °F (36.1 °C)] 96.5 °F (35.8 °C)  Heart Rate:  [71-84] 71  Resp:  [16-18] 16  BP: (111-119)/(74-82) 119/82    MENTAL STATUS EXAM:  Appearance: Casually dressed, fair hygeine.   Cooperation: Mostly cooperative  Psychomotor: + Psychomotor agitation/retardation, No EPS, No motor tics  Speech: Some thought blocking but speech is improving  Mood: \"Okay\"   Affect: congruent, restricted  Thought Content: goal directed, denies delusional material  Thought process: Disorganized  Suicidality: Denies SI  Homicidality: No HI  Perception: Denies AH/VH, does not appear to be responding to internal stimuli today  Insight: Fair  Judgment: fair    Lab Results (last 24 hours)     ** No results found for the last 24 hours. **             Imaging Results (Last 24 Hours)     ** No results found for the last 24 hours. **             ECG/EMG Results (most recent)     Procedure Component Value Units Date/Time    ECG 12 Lead Other [[id number]] Collected: 23     Updated: 23     QT Interval 402 ms      QTC " Interval 427 ms     Narrative:      Test Reason : Baseline Cardiac Status~  Blood Pressure :   */*   mmHG  Vent. Rate :  68 BPM     Atrial Rate :  68 BPM     P-R Int : 162 ms          QRS Dur :  84 ms      QT Int : 402 ms       P-R-T Axes :  61  55  52 degrees     QTc Int : 427 ms    Normal sinus rhythm  Normal ECG  No previous ECGs available  Confirmed by Cas Blackwell ([id number]) on 2/9/2023 8:19:12 AM    Referred By:            Confirmed By: Cas Blackwell           ALLERGIES: Keflex [cephalexin]      Current Facility-Administered Medications:   •  acetaminophen (TYLENOL) tablet 650 mg, 650 mg, Oral, Q6H PRN, Raymond Mike MD, 650 mg at 02/12/23 1200  •  aluminum-magnesium hydroxide-simethicone (MAALOX MAX) 400-400-40 MG/5ML suspension 15 mL, 15 mL, Oral, Q6H PRN, Raymond Mike MD  •  benzonatate (TESSALON) capsule 100 mg, 100 mg, Oral, TID PRN, Raymond Mike MD  •  benztropine (COGENTIN) tablet 2 mg, 2 mg, Oral, Once PRN **OR** benztropine (COGENTIN) injection 1 mg, 1 mg, Intramuscular, Once PRN, Raymond Mike MD  •  benztropine (COGENTIN) tablet 1 mg, 1 mg, Oral, BID, Raymond Mike MD, 1 mg at 02/12/23 0832  •  diphenhydrAMINE (BENADRYL) injection 50 mg, 50 mg, Intramuscular, Once PRN, Be Sharpe MD  •  famotidine (PEPCID) tablet 20 mg, 20 mg, Oral, BID PRN, Raymond Mike MD  •  hydrOXYzine (ATARAX) tablet 50 mg, 50 mg, Oral, Q6H PRN, Raymond Mike MD, 50 mg at 02/12/23 1200  •  ibuprofen (ADVIL,MOTRIN) tablet 400 mg, 400 mg, Oral, Q6H PRN, Raymond Mike MD, 400 mg at 02/10/23 1715  •  loperamide (IMODIUM) capsule 2 mg, 2 mg, Oral, Q2H PRN, Raymond Mike MD  •  magnesium hydroxide (MILK OF MAGNESIA) suspension 10 mL, 10 mL, Oral, Daily PRN, Raymond Mike MD  •  nicotine polacrilex (NICORETTE) gum 4 mg, 4 mg, Mouth/Throat, Q4H PRN, Be Sharpe MD, 4 mg at 02/12/23 0834  •  ondansetron (ZOFRAN) tablet 4 mg, 4 mg, Oral, Q6H PRN, Raymond Mike MD  •  propranolol  (INDERAL) tablet 10 mg, 10 mg, Oral, TID PRN, Be Sharpe MD  •  risperiDONE (risperDAL) tablet 2 mg, 2 mg, Oral, BID, Raymond Mike MD, 2 mg at 02/12/23 0832  •  sodium chloride nasal spray 2 spray, 2 spray, Each Nare, PRN, Raymond Mike MD  •  traZODone (DESYREL) tablet 100 mg, 100 mg, Oral, Nightly, Raymond Mike MD, 100 mg at 02/11/23 2044  •  traZODone (DESYREL) tablet 50 mg, 50 mg, Oral, Nightly PRN, Raymond Mike MD  •  venlafaxine XR (EFFEXOR-XR) 24 hr capsule 225 mg, 225 mg, Oral, Daily, Be Sharpe MD, 225 mg at 02/12/23 0833    Reviewed chart, notes, vitals, labs and EKG personally reviewed.    ASSESSMENT & PLAN:    Suicidal Ideation  -SI with plan  -Admit for crisis stabilization  -SP3     Schizophrenia, disorganized  -Patient disorganized, exhibiting thought blocking, hallucinations  -Continue previously prescribed risperidone 2 mg twice daily.  Agreeable to transition to [facility] before discharge  -Continue benztropine 1 mg twice daily  -Continue trazodone 100 mg nightly  -Increase Effexor XR to 225 mg daily  -We will try to obtain more information once patient improves  -We will refer for outpatient psychiatric care following hospitalization     Methamphetamine use disorder, severe, dependence  -Admission UDS negative.  Patient reports last use roughly 1 week ago  -Supportive care  -Ascertain substance abuse treatment plans following discharge     Urinary tract infection  -UA with 0 RBC, 6-12 WBC, negative nitrite  -PCR positive for gonorrhea.  Treated with one-time Rocephin 500 mg IM  -DC Macrobid    Nicotine use disorder, severe, dependence  -21 mg daily patch ordered  -Encouraged cessation     The patient has been admitted for safety and stabilization.  Patient will be monitored for suicidality daily and maintained on Special Precautions Level 3 (q15 min checks) .  The patient will have individual and group therapy with a master's level therapist. A master treatment plan  will be developed and agreed upon by the patient and his/her treatment team.  The patient's estimated length of stay in the hospital is 2-4 days.       Special precautions: Special Precautions Level 3 (q15 min checks)     Behavioral Health Treatment Plan and Problem List: I have reviewed and approved the Behavioral Health Treatment Plan and Problem list.  The patient has had a chance to review and agrees with the treatment plan.     Clinician:  Be Sharpe MD  02/12/23  16:31 EST

## 2023-02-12 NOTE — PLAN OF CARE
Problem: Adult Behavioral Health Plan of Care  Goal: Plan of Care Review  Outcome: Ongoing, Progressing  Flowsheets  Taken 2/12/2023 1548 by Charlie Cooney, RN  Outcome Evaluation: PATIENT VERBALIZES MODERATE ANXIETY AND DEPRESSION AS ONLY PROBLEMS THIS SHIFT. PATIENT IS AOX3, COOPERATIVE WITH NO S/S OF ACUTE DISTRESS NOTED.  Taken 2/12/2023 0803 by Charlie Cooney, RN  Plan of Care Reviewed With: patient  Patient Agreement with Plan of Care: agrees  Taken 2/11/2023 1614 by Rita Thomas RN  Progress: improving   Goal Outcome Evaluation:  Plan of Care Reviewed With: patient  Patient Agreement with Plan of Care: agrees        Outcome Evaluation: PATIENT VERBALIZES MODERATE ANXIETY AND DEPRESSION AS ONLY PROBLEMS THIS SHIFT. PATIENT IS AOX3, COOPERATIVE WITH NO S/S OF ACUTE DISTRESS NOTED.

## 2023-02-12 NOTE — PLAN OF CARE
Goal Outcome Evaluation:  Plan of Care Reviewed With: patient  Patient Agreement with Plan of Care: agrees        Pt states anxiety 6, depression 8. Pt is calm and cooperative with staff, med compliant.

## 2023-02-13 PROCEDURE — 99232 SBSQ HOSP IP/OBS MODERATE 35: CPT | Performed by: PSYCHIATRY & NEUROLOGY

## 2023-02-13 RX ADMIN — RISPERIDONE 2 MG: 1 TABLET, FILM COATED ORAL at 08:24

## 2023-02-13 RX ADMIN — ACETAMINOPHEN 650 MG: 325 TABLET ORAL at 17:28

## 2023-02-13 RX ADMIN — RISPERIDONE 2 MG: 1 TABLET, FILM COATED ORAL at 20:26

## 2023-02-13 RX ADMIN — NICOTINE POLACRILEX 4 MG: 2 GUM, CHEWING BUCCAL at 17:28

## 2023-02-13 RX ADMIN — TRAZODONE HYDROCHLORIDE 100 MG: 50 TABLET ORAL at 20:26

## 2023-02-13 RX ADMIN — BENZTROPINE MESYLATE 1 MG: 1 TABLET ORAL at 20:26

## 2023-02-13 RX ADMIN — BENZTROPINE MESYLATE 1 MG: 1 TABLET ORAL at 08:24

## 2023-02-13 RX ADMIN — VENLAFAXINE HYDROCHLORIDE 225 MG: 75 CAPSULE, EXTENDED RELEASE ORAL at 08:24

## 2023-02-13 RX ADMIN — IBUPROFEN 400 MG: 400 TABLET, FILM COATED ORAL at 20:26

## 2023-02-13 RX ADMIN — NICOTINE POLACRILEX 4 MG: 2 GUM, CHEWING BUCCAL at 10:53

## 2023-02-13 NOTE — PROGRESS NOTES
Navigator is helping Primary Therapist with the following referral:    Teresita Banuelos [phone]  -Sent today's MD note for review. 2/13  -Spoke with Parisa. They ask that we send tomorrows note for review. 2/13

## 2023-02-13 NOTE — PLAN OF CARE
Goal Outcome Evaluation:  Plan of Care Reviewed With: patient  Patient Agreement with Plan of Care: agrees  Consent Given to Review Plan with: No one  Progress: improving  Outcome Evaluation: Therapist met with Patient to review treatment proress and aftercare plans; Patient agreeable.      Problem: Adult Behavioral Health Plan of Care  Goal: Plan of Care Review  Outcome: Ongoing, Progressing  Flowsheets  Taken 2/13/2023 1407  Progress: improving  Plan of Care Reviewed With: patient  Patient Agreement with Plan of Care: agrees  Outcome Evaluation:   Therapist met with Patient to review treatment proress and aftercare plans   Patient agreeable.  Taken 2/9/2023 0957  Consent Given to Review Plan with: No one  Goal: Optimized Coping Skills in Response to Life Stressors  Outcome: Ongoing, Progressing  Flowsheets (Taken 2/13/2023 1407)  Optimized Coping Skills in Response to Life Stressors: making progress toward outcome  Intervention: Promote Effective Coping Strategies  Flowsheets (Taken 2/13/2023 1407)  Supportive Measures:   active listening utilized   counseling provided   goal-setting facilitated   verbalization of feelings encouraged  Goal: Develops/Participates in Therapeutic [place] to Support Successful Transition  Outcome: Ongoing, Progressing  Flowsheets (Taken 2/13/2023 1407)  Develops/Participates in Therapeutic [place] to Support Successful Transition: making progress toward outcome  Intervention: Foster Therapeutic [place]  Flowsheets (Taken 2/13/2023 1407)  Trust Relationship/Rapport:   care explained   questions encouraged   choices provided   reassurance provided   emotional support provided   thoughts/feelings acknowledged   empathic listening provided   questions answered     DATA: Therapist met with Patient individually this date. Patient agreeable to discuss current treatment progress and discharge concerns.     [facility] today. However they report that they will reconsider if  Patient makes progress. They request that new note be sent tomorrow.     CLINICAL MANUVERING/INTERVENTIONS:  Assisted Patient in processing session content; acknowledged and normalized Patient’s thoughts, feelings, and concerns by utilizing a person-centered approach in efforts to build appropriate rapport and a positive therapeutic relationship with open and honest communication. Allowed Patient to ventilate regarding current stressors and triggers for negative emotions and thoughts in a safe nonjudgmental environment with unconditional positive regard, active listening skills, and empathy.     ASSESSMENT: Patient was seen 1-1 for a follow up today. Patient continues to receive treatment for psychosis. Patient continues to report improvement in symptoms. Patient denies SI, HI, and AVH. She states that anxiety and depression are improving, however she admits to feeling somewhat manic. She continues to pace and be easily distracted. However does not appear to be overly psychotic in any way. Patient is still very preoccupied with being discharged to rehab.       PLAN:   Patient will continue stabilization. Patient will continue to receive services offered by Treatment Team.     Pending referral with [facility].

## 2023-02-13 NOTE — PROGRESS NOTES
"INPATIENT PSYCHIATRIC PROGRESS NOTE    Name:  Rachel Shea  :  2000  MRN:  [id number]  Visit Number:  [id number]  Length of stay:  5    SUBJECTIVE    CC/Focus of Exam: Psychosis    INTERVAL HISTORY:  The patient reports she came to the hospital because she was feeling suicidal while at a rehab. She reports a history of meth use. She states she is feeling better and is not feeling suicidal today. Also reports no hallucinations. She states she is feeling really tired and worn out.   Depression rating 10/10  Anxiety rating 10/10  Sleep: good  Withdrawal sx: little bit, legs hurting  Cravin/10 for meth    Review of Systems   Constitutional: Negative.    Respiratory: Negative.    Cardiovascular: Negative.    Gastrointestinal: Negative.        OBJECTIVE    Temp:  [96.8 °F (36 °C)-97.6 °F (36.4 °C)] 96.8 °F (36 °C)  Heart Rate:  [78-99] 78  Resp:  [18] 18  BP: (107-112)/(69-74) 107/69    MENTAL STATUS EXAM:  Appearance:Casually dressed, good hygeine.   Cooperation:Cooperative  Psychomotor: No psychomotor agitation/retardation, No EPS, No motor tics  Speech-normal rate, amount.  Mood \"better\"   Affect-congruent, appropriate, stable  Thought Content-goal directed, no delusional material present  Thought process-linear, organized.  Suicidality: No SI  Homicidality: No HI  Perception: No AH/VH  Insight-fair   Judgement-fair    Lab Results (last 24 hours)     Procedure Component Value Units Date/Time    POC Glucose Once [[id number]]  (Normal) Collected: 23    Specimen: Blood Updated: 23     Glucose 94 mg/dL      Comment: Meter: [id number] : [id number] [id number] COLVIN                Imaging Results (Last 24 Hours)     ** No results found for the last 24 hours. **             ECG/EMG Results (most recent)     Procedure Component Value Units Date/Time    ECG 12 Lead Other [[id number]] Collected: 23     Updated: 23     QT Interval 402 ms      QTC Interval 427 ms     " Narrative:      Test Reason : Baseline Cardiac Status~  Blood Pressure :   */*   mmHG  Vent. Rate :  68 BPM     Atrial Rate :  68 BPM     P-R Int : 162 ms          QRS Dur :  84 ms      QT Int : 402 ms       P-R-T Axes :  61  55  52 degrees     QTc Int : 427 ms    Normal sinus rhythm  Normal ECG  No previous ECGs available  Confirmed by Cas Blackwell ([id number]) on 2/9/2023 8:19:12 AM    Referred By:            Confirmed By: Cas Blackwell           ALLERGIES: Keflex [cephalexin]    Medication Review:   Scheduled Medications:  benztropine, 1 mg, Oral, BID  risperiDONE, 2 mg, Oral, BID  traZODone, 100 mg, Oral, Nightly  venlafaxine XR, 225 mg, Oral, Daily         PRN Medications:  •  acetaminophen  •  aluminum-magnesium hydroxide-simethicone  •  benzonatate  •  benztropine **OR** benztropine  •  diphenhydrAMINE  •  famotidine  •  hydrOXYzine  •  ibuprofen  •  loperamide  •  magnesium hydroxide  •  nicotine polacrilex  •  ondansetron  •  propranolol  •  sodium chloride  •  traZODone   All medications reviewed.    ASSESSMENT & PLAN:    Suicidal Ideation  -SI with plan  -Admit for crisis stabilization  -SP3     Schizophrenia, disorganized  -Patient disorganized, exhibiting thought blocking, hallucinations  -Continue previously prescribed risperidone 2 mg twice daily.  Agreeable to transition to Perseris before discharge  -Continue benztropine 1 mg twice daily  -Continue trazodone 100 mg nightly  -Continue Effexor XR to 225 mg daily  -We will try to obtain more information once patient improves  -We will refer for outpatient psychiatric care following hospitalization     Methamphetamine use disorder, severe, dependence  -Admission UDS negative.  Patient reports last use roughly 1 week ago  -Supportive care  -Ascertain substance abuse treatment plans following discharge     Urinary tract infection  -UA with 0 RBC, 6-12 WBC, negative nitrite  -PCR positive for gonorrhea.  Treated with one-time Rocephin 500 mg IM  -DC  Macrobid     Nicotine use disorder, severe, dependence  -21 mg daily patch ordered  -Encouraged cessation     Special precautions: Special Precautions Level 3 (q15 min checks) .    Behavioral Health Treatment Plan and Problem List: I have reviewed and approved the Behavioral Health Treatment Plan and Problem list.  The patient has had a chance to review and agrees with the treatment plan.     Clinician:  Sameera Casas MD  02/13/23  12:44 EST

## 2023-02-13 NOTE — PLAN OF CARE
Goal Outcome Evaluation:  Plan of Care Reviewed With: patient  Patient Agreement with Plan of Care: agrees        Outcome Evaluation: Patient reports anxiety at 10 and depression at 7.  Denies SI/HI or AVH.  Patient cooperative this shift.

## 2023-02-13 NOTE — PLAN OF CARE
Problem: Adult Behavioral Health Plan of Care  Goal: Plan of Care Review  Outcome: Ongoing, Progressing  Flowsheets  Taken 2/13/2023 1408 by Charlie Cooney, RN  Outcome Evaluation: PATIENT VERBALIZES SEVERE ANXIETY AND DEPRESSION AS ONLY PROBLEMS THIS SHIFT. PATIENT IS AOX3, COOPERATIVE WITH NO S/S OF ACUTE DISTRESS NOTED. NNO NOTED  Taken 2/13/2023 1407 by Adamaris Bacon MSW  Progress: improving  Plan of Care Reviewed With: patient  Patient Agreement with Plan of Care: agrees  Taken 2/13/2023 0754 by Charlie Cooney, RN  Plan of Care Reviewed With: patient  Patient Agreement with Plan of Care: agrees   Goal Outcome Evaluation:  Plan of Care Reviewed With: patient  Patient Agreement with Plan of Care: agrees        Outcome Evaluation: PATIENT VERBALIZES SEVERE ANXIETY AND DEPRESSION AS ONLY PROBLEMS THIS SHIFT. PATIENT IS AOX3, COOPERATIVE WITH NO S/S OF ACUTE DISTRESS NOTED. NNO NOTED

## 2023-02-13 NOTE — NURSING NOTE
"PATIENT STATES \" FEEL LIKE IM GOING GTO HAVE A SEIZURE.\" V/S OBTAINED. SEE CHART. PATIENT EDUCATED ON FALL PREVENTION. PATIENT VERBALIZED UNDERSTANDING. PATIENT APPEARS STABLE WITH NO S/S OF ACUTE DISTRESS NOTED.   "

## 2023-02-14 PROCEDURE — 99232 SBSQ HOSP IP/OBS MODERATE 35: CPT | Performed by: PSYCHIATRY & NEUROLOGY

## 2023-02-14 RX ADMIN — TRAZODONE HYDROCHLORIDE 100 MG: 50 TABLET ORAL at 20:18

## 2023-02-14 RX ADMIN — RISPERIDONE 2 MG: 1 TABLET, FILM COATED ORAL at 08:18

## 2023-02-14 RX ADMIN — NICOTINE POLACRILEX 4 MG: 2 GUM, CHEWING BUCCAL at 08:38

## 2023-02-14 RX ADMIN — HYDROXYZINE HYDROCHLORIDE 50 MG: 25 TABLET ORAL at 09:32

## 2023-02-14 RX ADMIN — IBUPROFEN 400 MG: 400 TABLET, FILM COATED ORAL at 20:19

## 2023-02-14 RX ADMIN — ACETAMINOPHEN 650 MG: 325 TABLET ORAL at 15:03

## 2023-02-14 RX ADMIN — VENLAFAXINE HYDROCHLORIDE 225 MG: 75 CAPSULE, EXTENDED RELEASE ORAL at 08:18

## 2023-02-14 RX ADMIN — BENZTROPINE MESYLATE 1 MG: 1 TABLET ORAL at 20:18

## 2023-02-14 RX ADMIN — NICOTINE POLACRILEX 4 MG: 2 GUM, CHEWING BUCCAL at 15:03

## 2023-02-14 RX ADMIN — BENZTROPINE MESYLATE 1 MG: 1 TABLET ORAL at 08:18

## 2023-02-14 RX ADMIN — RISPERIDONE 2 MG: 1 TABLET, FILM COATED ORAL at 20:18

## 2023-02-14 NOTE — PLAN OF CARE
Problem: Adult Behavioral Health Plan of Care  Goal: Plan of Care Review  Outcome: Ongoing, Progressing  Flowsheets  Taken 2/14/2023 1441 by Charlie Cooney, RN  Outcome Evaluation: PATIENT IS AOX3, COOPERATIVE WITH NO S/S OF ACUTE DISTRESS NOTED  Taken 2/14/2023 1134 by Adamaris Bacon MSW  Progress: improving  Plan of Care Reviewed With: patient  Patient Agreement with Plan of Care: agrees  Taken 2/14/2023 0820 by Charlie Cooney, RN  Plan of Care Reviewed With: patient  Patient Agreement with Plan of Care: agrees   Goal Outcome Evaluation:  Plan of Care Reviewed With: patient  Patient Agreement with Plan of Care: agrees        Outcome Evaluation: PATIENT IS AOX3, COOPERATIVE WITH NO S/S OF ACUTE DISTRESS NOTED
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
## 2023-02-14 NOTE — PROGRESS NOTES
Navigator is helping Primary Therapist with the following referral:     [facility] - [phone]  -Sent 2/14    [email or web address] - [phone]  -Sent today's MD note for review. 2/13  -Spoke with Parisa. They ask that we send tomorrows note for review. 2/13  -Faxed todays MD note for review. 2/14

## 2023-02-14 NOTE — PLAN OF CARE
Goal Outcome Evaluation:  Plan of Care Reviewed With: patient  Patient Agreement with Plan of Care: agrees  Consent Given to Review Plan with: No one  Progress: improving  Outcome Evaluation: Therapist met with Patient to review treatment progress and aftercare plans; Patient agreeable.      Problem: Adult Behavioral Health Plan of Care  Goal: Plan of Care Review  Outcome: Ongoing, Progressing  Flowsheets  Taken 2/14/2023 1134  Progress: improving  Plan of Care Reviewed With: patient  Patient Agreement with Plan of Care: agrees  Outcome Evaluation:   Therapist met with Patient to review treatment progress and aftercare plans   Patient agreeable.  Taken 2/9/2023 0957  Consent Given to Review Plan with: No one  Goal: Optimized Coping Skills in Response to Life Stressors  Outcome: Ongoing, Progressing  Flowsheets (Taken 2/14/2023 1134)  Optimized Coping Skills in Response to Life Stressors: making progress toward outcome  Intervention: Promote Effective Coping Strategies  Flowsheets (Taken 2/14/2023 1134)  Supportive Measures:   active listening utilized   counseling provided   goal-setting facilitated   verbalization of feelings encouraged  Goal: Develops/Participates in Therapeutic [place] to Support Successful Transition  Outcome: Ongoing, Progressing  Flowsheets (Taken 2/14/2023 1134)  Develops/Participates in Therapeutic [place] to Support Successful Transition: making progress toward outcome  Intervention: Foster Therapeutic [place]  Flowsheets (Taken 2/14/2023 1134)  Trust Relationship/Rapport:   care explained   questions encouraged   reassurance provided   emotional support provided   choices provided   thoughts/feelings acknowledged   empathic listening provided   questions answered     DATA: Therapist met with Patient individually this date. Patient agreeable to discuss current treatment progress and discharge concerns.     New notes to be sent to [facility] today.    CLINICAL MANUVERING/INTERVENTIONS:   Assisted Patient in processing session content; acknowledged and normalized Patient’s thoughts, feelings, and concerns by utilizing a person-centered approach in efforts to build appropriate rapport and a positive therapeutic relationship with open and honest communication. Allowed Patient to ventilate regarding current stressors and triggers for negative emotions and thoughts in a safe nonjudgmental environment with unconditional positive regard, active listening skills, and empathy.     ASSESSMENT: Patient was seen 1-1 for a follow up today. Patient continues to be treated for psychosis at this time. Patient states that she is doing well, and continues to frequently ask about her discharge plan. This therapist made Patient aware that [facility] is requesting updates notes on her progress. She is accepting of this, but still eager to be discharged. Patient does not appear to be overtly psychotic at this time. She does have poor boundaries at times and seeks out staff frequently. She also has inappropriate laughter at times. However she is redirectable and calm.    PLAN:   Patient will continue stabilization. Patient will continue to receive services offered by Treatment Team.     Pending referral with [facility].

## 2023-02-14 NOTE — PROGRESS NOTES
"INPATIENT PSYCHIATRIC PROGRESS NOTE    Name:  Rachel Shea  :  2000  MRN:  [id number]  Visit Number:  [id number]  Length of stay:  6    SUBJECTIVE    CC/Focus of Exam: Psychosis    INTERVAL HISTORY:  The patient reports she is feeling better today and is hoping to go to rehab but she has not talked to any at the rehab place yet. Depression rating 10/10  Anxiety rating 10/10  Sleep: good  Withdrawal sx: aches and pains  Craving: 10/10 for meth    Review of Systems   Constitutional: Negative.    Respiratory: Negative.    Cardiovascular: Negative.    Gastrointestinal: Negative.        OBJECTIVE    Temp:  [97.4 °F (36.3 °C)-98.1 °F (36.7 °C)] 97.4 °F (36.3 °C)  Heart Rate:  [] 98  Resp:  [18] 18  BP: (105-121)/(70-82) 116/82    MENTAL STATUS EXAM:  Appearance:Casually dressed, good hygeine.   Cooperation:Cooperative  Psychomotor: No psychomotor agitation/retardation, No EPS, No motor tics  Speech-normal rate, amount.  Mood \"better\"   Affect-congruent, appropriate, stable  Thought Content-goal directed, no delusional material present  Thought process-linear, organized.  Suicidality: No SI  Homicidality: No HI  Perception: No AH/VH  Insight-limited  Judgement-limited    Lab Results (last 24 hours)     ** No results found for the last 24 hours. **             Imaging Results (Last 24 Hours)     ** No results found for the last 24 hours. **             ECG/EMG Results (most recent)     Procedure Component Value Units Date/Time    ECG 12 Lead Other [[id number]] Collected: 23     Updated: 23     QT Interval 402 ms      QTC Interval 427 ms     Narrative:      Test Reason : Baseline Cardiac Status~  Blood Pressure :   */*   mmHG  Vent. Rate :  68 BPM     Atrial Rate :  68 BPM     P-R Int : 162 ms          QRS Dur :  84 ms      QT Int : 402 ms       P-R-T Axes :  61  55  52 degrees     QTc Int : 427 ms    Normal sinus rhythm  Normal ECG  No previous ECGs available  Confirmed by Rishi" Cas (2003) on 2/9/2023 8:19:12 AM    Referred By:            Confirmed By: Cas Blackwell           ALLERGIES: Keflex [cephalexin]    Medication Review:   Scheduled Medications:  benztropine, 1 mg, Oral, BID  risperiDONE, 2 mg, Oral, BID  traZODone, 100 mg, Oral, Nightly  venlafaxine XR, 225 mg, Oral, Daily         PRN Medications:  •  acetaminophen  •  aluminum-magnesium hydroxide-simethicone  •  benzonatate  •  benztropine **OR** benztropine  •  diphenhydrAMINE  •  famotidine  •  hydrOXYzine  •  ibuprofen  •  loperamide  •  magnesium hydroxide  •  nicotine polacrilex  •  ondansetron  •  propranolol  •  sodium chloride  •  traZODone   All medications reviewed.    ASSESSMENT & PLAN:    Suicidal Ideation  -SI with plan  -Admit for crisis stabilization  -SP3     Schizophrenia, disorganized  -Patient disorganized, exhibiting thought blocking, hallucinations  -Continue previously prescribed risperidone 2 mg twice daily.  Agreeable to transition to [facility] before discharge  -Continue benztropine 1 mg twice daily  -Continue trazodone 100 mg nightly  -Continue Effexor XR to 225 mg daily  -We will try to obtain more information once patient improves  -We will refer for outpatient psychiatric care following hospitalization     Methamphetamine use disorder, severe, dependence  -Admission UDS negative.  Patient reports last use roughly 1 week ago  -Supportive care  -Ascertain substance abuse treatment plans following discharge     Urinary tract infection  -UA with 0 RBC, 6-12 WBC, negative nitrite  -PCR positive for gonorrhea.  Treated with one-time Rocephin 500 mg IM  -DC Macrobid     Nicotine use disorder, severe, dependence  -21 mg daily patch ordered  -Encouraged cessation     Special precautions: Special Precautions Level 3 (q15 min checks) .    Behavioral Health Treatment Plan and Problem List: I have reviewed and approved the Behavioral Health Treatment Plan and Problem list.  The patient has had a chance to  review and agrees with the treatment plan.     Clinician:  Sameera Casas MD  02/14/23  13:41 EST

## 2023-02-14 NOTE — PLAN OF CARE
Goal Outcome Evaluation:  Plan of Care Reviewed With: patient  Patient Agreement with Plan of Care: agrees     Progress: improving  Outcome Evaluation: Pt stated her appetite was good but she was not sleeping thru the night. PT rated anxiety an 8/10 and depression a 9/10. Pt stated she had no si/hi or avh. PT was not oriented to date and place. Pt was at nursing station several times thru out the shift with multiple request but was redirectable. Pt was calm and cooperative with no issues or concerns.

## 2023-02-15 VITALS
BODY MASS INDEX: 26.39 KG/M2 | SYSTOLIC BLOOD PRESSURE: 102 MMHG | RESPIRATION RATE: 16 BRPM | OXYGEN SATURATION: 97 % | DIASTOLIC BLOOD PRESSURE: 70 MMHG | HEART RATE: 109 BPM | WEIGHT: 164.2 LBS | HEIGHT: 66 IN | TEMPERATURE: 98 F

## 2023-02-15 PROBLEM — F15.20 METHAMPHETAMINE USE DISORDER, SEVERE, DEPENDENCE: Status: ACTIVE | Noted: 2023-02-15

## 2023-02-15 PROBLEM — A54.9 GONORRHEA: Status: ACTIVE | Noted: 2023-02-15

## 2023-02-15 PROBLEM — R45.851 SUICIDAL IDEATION: Status: RESOLVED | Noted: 2023-02-08 | Resolved: 2023-02-15

## 2023-02-15 PROBLEM — F20.1 DISORGANIZED SCHIZOPHRENIA: Status: ACTIVE | Noted: 2023-02-15

## 2023-02-15 PROCEDURE — 99239 HOSP IP/OBS DSCHRG MGMT >30: CPT | Performed by: PSYCHIATRY & NEUROLOGY

## 2023-02-15 PROCEDURE — 25010000002: Performed by: PSYCHIATRY & NEUROLOGY

## 2023-02-15 RX ORDER — RISPERIDONE 120 MG
120 KIT SUBCUTANEOUS
Qty: 1 EACH | Refills: 1 | Status: SHIPPED | OUTPATIENT
Start: 2023-02-15

## 2023-02-15 RX ORDER — BENZTROPINE MESYLATE 1 MG/1
1 TABLET ORAL 2 TIMES DAILY
Qty: 60 TABLET | Refills: 0 | Status: SHIPPED | OUTPATIENT
Start: 2023-02-15 | End: 2023-02-15 | Stop reason: SDUPTHER

## 2023-02-15 RX ORDER — TRAZODONE HYDROCHLORIDE 100 MG/1
100 TABLET ORAL NIGHTLY
Qty: 30 TABLET | Refills: 0 | Status: SHIPPED | OUTPATIENT
Start: 2023-02-15 | End: 2023-02-15 | Stop reason: SDUPTHER

## 2023-02-15 RX ORDER — BENZTROPINE MESYLATE 1 MG/1
1 TABLET ORAL 2 TIMES DAILY
Qty: 60 TABLET | Refills: 0 | Status: SHIPPED | OUTPATIENT
Start: 2023-02-15

## 2023-02-15 RX ORDER — VENLAFAXINE HYDROCHLORIDE 75 MG/1
225 CAPSULE, EXTENDED RELEASE ORAL DAILY
Qty: 90 CAPSULE | Refills: 0 | Status: SHIPPED | OUTPATIENT
Start: 2023-02-16

## 2023-02-15 RX ORDER — TRAZODONE HYDROCHLORIDE 100 MG/1
100 TABLET ORAL NIGHTLY
Qty: 30 TABLET | Refills: 0 | Status: SHIPPED | OUTPATIENT
Start: 2023-02-15

## 2023-02-15 RX ADMIN — RISPERIDONE 2 MG: 1 TABLET, FILM COATED ORAL at 08:32

## 2023-02-15 RX ADMIN — VENLAFAXINE HYDROCHLORIDE 225 MG: 75 CAPSULE, EXTENDED RELEASE ORAL at 08:32

## 2023-02-15 RX ADMIN — ACETAMINOPHEN 650 MG: 325 TABLET ORAL at 08:58

## 2023-02-15 RX ADMIN — HYDROXYZINE HYDROCHLORIDE 50 MG: 25 TABLET ORAL at 10:06

## 2023-02-15 RX ADMIN — BENZTROPINE MESYLATE 1 MG: 1 TABLET ORAL at 08:32

## 2023-02-15 RX ADMIN — RISPERIDONE 120 MG: KIT SUBCUTANEOUS at 14:35

## 2023-02-15 RX ADMIN — NICOTINE POLACRILEX 4 MG: 2 GUM, CHEWING BUCCAL at 08:32

## 2023-02-15 NOTE — PROGRESS NOTES
Navigator is helping Primary Therapist with the following referral:     [facility] - [phone]  -Sent 2/14  -Transferred call so patient could complete phone screening. 2/15  -Patient completed phone screening.    -Bed available today at the [place] location. Intake to call back with  time. 2/15  - 1:30-2:00pm.       [facility] [phone]  -Sent today's MD note for review. 2/13  -Spoke with Parisa. They ask that we send tomorrows note for review. 2/13  -Faxed todays MD note for review. 2/14

## 2023-02-15 NOTE — PLAN OF CARE
Goal Outcome Evaluation:  Plan of Care Reviewed With: patient  Patient Agreement with Plan of Care: agrees     Pt states her cravings for meth 10/10. She denies anxiety and depression at this time. Pt is calm and cooperative with staff, med compliant.

## 2023-02-15 NOTE — PLAN OF CARE
Goal Outcome Evaluation:  Plan of Care Reviewed With: patient  Patient Agreement with Plan of Care: agrees     Progress: improving  Outcome Evaluation: Patient calm and cooperative. Denies anxiety, depression, SI/HI or AVH. Patient is being discharged to [facility] today.

## 2023-02-15 NOTE — PROGRESS NOTES
Discharge Planning Assessment   Ravi     Patient Name: Rachel Shea  MRN: [id number]  Today's Date: 2/15/2023    Admit Date: 2/8/2023    Patient is being discharged to [facility] today. Patient is agreeable to this plan. Patient denies SI, HI, and AVH today. [facility] to provide transportation.         CHARISSA Forman

## 2023-02-16 NOTE — PROGRESS NOTES
Behavioral Health Discharge Summary             Please fax within 24 hours of discharge to [facility] at: [phone]      Member Name: Rachel Shea Member ID: [id number]   Authorization Number: [id number] Phone: [phone]   Member Address: [address] [place] [address]   Discharge Date: 02/15/2023 Level of Care at Discharge:    Facility: [facility] Staff Completing Form: Lissette RAUSCH RN U.R.   If the member is being discharged directly to a residential or extended care program, please specify the type below.   __Private Child-Caring Facility (PCC) Residential/Group Home   __Private Child-Caring Facility (PCC) Therapeutic Foster Care   __Residential Treatment Facility (RTF)   __Psychiatric Residential Treatment Facility (PRTF I or II)   __Long-Term Acute Inpatient Hospital Services or Extended Care Unit (ECU)   __Other (please specify):    Brief discharge summary of treatment received (for follow up by the case management team): D/C clinical with list of medications and follow up appts given to patient upon discharge.     BRIEF SUMMARY OF RECOMMENDATIONS FOR ONGOING TREATMENT     Discharged to where: [facility]    Discharge diagnoses: F 20.1  F 15.20   Axis I:    Axis II:    Axis III:    Axis IV:    Axis V:    Does the member understand his/her DX?  Yes          Medication     Dose     Schedule Supply/  Quantity  Given at Discharge RX Provided  Yes/No  If Rx Provided, Quantity RX Prior Auth Required  Yes/No Prior Auth  Completed   Perseris  120 mg prefilled syringe Every 28 days         Venlafaxine XR  75 mg  Every 24 hours                                                                               Does the member understand the reason for taking these medications? Yes                                                           FOLLOW-UP APPOINTMENTS   Please schedule within 7 days of discharge and provide appointment details for all referred services.     PCP/Other Providers Involved in Treatment:    Appointment Type: IP REHAB  Provider Name: [facility]  Provider Phone: [phone]  Appointment Date: 02/15/2023 Appointment Time: admit upon arrival      Assessment   (new to OP services)        Case Management    Is the member already enrolled in case management?  Yes/No  If yes, date the CM was notified:    If no, was the CM referral offered?  Yes/No  Accepted? Yes/No    Is the Release of Information in the chart? Yes/No:      Medication Management (for member discharged with psychiatric medications):      A&D Treatment (for member with substance abuse/   dependence in the past year):      Medical Condition (for member with a medical condition):    Other recommended treatment:    Do you have any concerns about the discharge plan?  No    If yes, explain:    Was the member involved in the discharge planning?  Yes    If no, explain:    Was a copy of the discharge plan provided to the member?  Yes    If no, explain:

## 2023-02-17 NOTE — DISCHARGE SUMMARY
"      PSYCHIATRIC DISCHARGE SUMMARY     Patient Identification:  Name:  Rachel Shea  Age:  22 y.o.  Sex:  female  :  2000  MRN:  [id number]  Visit Number:  [id number]    Date of Admission:2023   Date of Discharge: 2/15/2023     Discharge Diagnosis:  Active Problems:    Disorganized schizophrenia (HCC)    Methamphetamine use disorder, severe, dependence (HCC)    Gonorrhea      Admission Diagnosis:  Suicidal ideation [R45.851]     Hospital Course  Patient is a 22 y.o. female presented with psychosis and SI.  Admitted for crisis stabilization.  Admission labs suggested UTI and follow-up testing positive for gonorrhea, which was treated with a one-time Rocephin 5 mg IM.  Patient resumed on previous medication of risperidone 2 mg twice daily, Effexor XR, which was increased to 225 mg daily, benztropine and trazodone.  She was agreeable to transition to Perseris 120 mg subcutaneous injection, which she tolerated well on the day of discharge.  Symptoms improved over the course of her stay and she was referred to rehab.  She was accepted by [facility] and discharged to their care today.    On the day of discharge, patient denied SI, HI or AVH. Patient was stable and appropriate by the conclusion of this admission, denying significant symptoms of mood, psychotic or thought disorder. Patient showed improvement of presenting symptoms and was deemed appropriate for discharge today.    Mental Status Exam upon discharge:   Mood \"better\"   Affect-congruent, appropriate, stable  Thought Content-goal directed, no delusional material present  Thought process-linear, organized.  Suicidality: No SI  Homicidality: No HI  Perception: No AH/VH    Procedures Performed         Consults:   Consults     No orders found from 1/10/2023 to 2023.          Pertinent Test Results:   Lab Results (last 7 days)     Procedure Component Value Units Date/Time    POC Glucose Once [[id number]]  (Normal) Collected: 23    " Specimen: Blood Updated: 02/12/23 2003     Glucose 94 mg/dL      Comment: Meter: [id number] : [id number] MARYCRUZ COLVIN       HIV-1 & HIV-2 Antibodies [[id number]]  (Normal) Collected: 02/09/23 1552    Specimen: Blood Updated: 02/09/23 1655    Narrative:      The following orders were created for panel order HIV-1 & HIV-2 Antibodies.  Procedure                               Abnormality         Status                     ---------                               -----------         ------                     HIV-1 / O / 2 Ag / Antib...[[id number]]  Normal              Final result                 Please view results for these tests on the individual orders.    HIV-1 / O / 2 Ag / Antibody 4th Generation [[id number]]  (Normal) Collected: 02/09/23 1552    Specimen: Blood Updated: 02/09/23 1655     HIV-1/ HIV-2 Non-Reactive     Comment: A non-reactive test result does not preclude the possibility of exposure to HIV or infection with HIV. An antibody response to recent exposure may take several months to reach detectable levels.       Narrative:      The HIV antibody/antigen combo assay is a qualitative assay for HIV that includes the p24 antigen as well as antibodies to HIV types 1 and 2. This test is intended to be used as a screening assay in the diagnosis of HIV infection in patients over the age of 2.    Hepatitis Panel, Acute [[id number]]  (Abnormal) Collected: 02/09/23 1552    Specimen: Blood Updated: 02/09/23 1648     Hepatitis B Surface Ag Non-Reactive     Hep A IgM Non-Reactive     Hep B C IgM Non-Reactive     Hepatitis C Ab Reactive    Narrative:      Results may be falsely decreased if patient taking Biotin.     Chlamydia trachomatis, Neisseria gonorrhoeae, Trichomonas vaginalis, PCR - Urine, Urine, Clean Catch [[id number]]  (Abnormal) Collected: 02/08/23 1235    Specimen: Urine, Clean Catch Updated: 02/09/23 1533     Chlamydia DNA by PCR Not Detected     Neisseria gonorrhoeae by PCR Detected      Trichomonas vaginalis PCR Not Detected          Condition on Discharge:  improved    Vital Signs       Discharge Disposition:  Rehab Facility or Unit (DC - External)    Discharge Medications:     Discharge Medications      New Medications      Instructions Start Date   Perseris 120 MG prefilled syringe  Generic drug: risperiDONE ER  Replaces: risperiDONE 2 MG tablet   120 mg, Subcutaneous, Every 28 Days         Changes to Medications      Instructions Start Date   venlafaxine XR 75 MG 24 hr capsule  Commonly known as: EFFEXOR-XR  What changed:   · medication strength  · how much to take   225 mg, Oral, Daily         Continue These Medications      Instructions Start Date   benztropine 1 MG tablet  Commonly known as: COGENTIN   1 mg, Oral, 2 Times Daily      traZODone 100 MG tablet  Commonly known as: DESYREL   100 mg, Oral, Nightly         Stop These Medications    nitrofurantoin (macrocrystal-monohydrate) 100 MG capsule  Commonly known as: MACROBID     risperiDONE 2 MG tablet  Commonly known as: risperDAL  Replaced by: Perseris 120 MG prefilled syringe            Discharge Diet: Normal  Diet Instructions    Regular           Activity at Discharge: Normal  Activity Instructions    As tolerated           Follow-up Appointments  Future Appointments   Date Time Provider Department [place]   2/22/2023  3:10 PM [facility]     Additional Instructions for the Follow-ups that You Need to Schedule     Ambulatory Referral to Specialty Pharmacy   As directed      Service Requested: Hepatitis C    Follow-up needed: Yes               Test Results Pending at Discharge  None     Time: I spent greater than 30 minutes on this discharge activity which included: face-to-face encounter with the patient, reviewing the data in the system, coordination of the care with the nursing staff as well as consultants, documentation, and entering orders.      Clinician:   Be Sharpe MD  02/17/23  14:04 EST